# Patient Record
Sex: MALE | Race: WHITE | Employment: OTHER | URBAN - METROPOLITAN AREA
[De-identification: names, ages, dates, MRNs, and addresses within clinical notes are randomized per-mention and may not be internally consistent; named-entity substitution may affect disease eponyms.]

---

## 2017-06-22 ENCOUNTER — INITIAL CONSULT (OUTPATIENT)
Dept: SURGERY | Age: 82
End: 2017-06-22

## 2017-06-22 VITALS
WEIGHT: 157.6 LBS | DIASTOLIC BLOOD PRESSURE: 74 MMHG | HEART RATE: 82 BPM | BODY MASS INDEX: 24.74 KG/M2 | HEIGHT: 67 IN | SYSTOLIC BLOOD PRESSURE: 101 MMHG

## 2017-06-22 DIAGNOSIS — K81.0 ACUTE CHOLECYSTITIS: Primary | ICD-10-CM

## 2017-06-22 PROCEDURE — 99203 OFFICE O/P NEW LOW 30 MIN: CPT | Performed by: SURGERY

## 2017-06-22 PROCEDURE — 4040F PNEUMOC VAC/ADMIN/RCVD: CPT | Performed by: SURGERY

## 2017-06-22 PROCEDURE — G8427 DOCREV CUR MEDS BY ELIG CLIN: HCPCS | Performed by: SURGERY

## 2017-06-22 PROCEDURE — 1036F TOBACCO NON-USER: CPT | Performed by: SURGERY

## 2017-06-22 PROCEDURE — G8420 CALC BMI NORM PARAMETERS: HCPCS | Performed by: SURGERY

## 2017-09-01 ENCOUNTER — TELEPHONE (OUTPATIENT)
Dept: SURGERY | Age: 82
End: 2017-09-01

## 2018-02-18 PROBLEM — J44.9 COPD (CHRONIC OBSTRUCTIVE PULMONARY DISEASE) (HCC): Status: ACTIVE | Noted: 2018-02-18

## 2018-02-18 PROBLEM — K80.00 ACUTE CALCULOUS CHOLECYSTITIS: Status: ACTIVE | Noted: 2017-06-22

## 2018-03-02 ENCOUNTER — TELEPHONE (OUTPATIENT)
Dept: SURGERY | Age: 83
End: 2018-03-02

## 2018-03-02 NOTE — TELEPHONE ENCOUNTER
Patient's wife called stating he had a small amount of drainage around the tube-she changed the bandage. He is doing well and made an appointment for 3/8/18.

## 2018-03-08 ENCOUNTER — OFFICE VISIT (OUTPATIENT)
Dept: SURGERY | Age: 83
End: 2018-03-08

## 2018-03-08 VITALS
SYSTOLIC BLOOD PRESSURE: 146 MMHG | TEMPERATURE: 102 F | HEART RATE: 47 BPM | WEIGHT: 144.2 LBS | HEIGHT: 68 IN | BODY MASS INDEX: 21.86 KG/M2 | DIASTOLIC BLOOD PRESSURE: 94 MMHG

## 2018-03-08 DIAGNOSIS — K81.0 ACUTE CHOLECYSTITIS: Primary | ICD-10-CM

## 2018-03-08 PROCEDURE — 99212 OFFICE O/P EST SF 10 MIN: CPT | Performed by: SURGERY

## 2018-03-08 PROCEDURE — 1036F TOBACCO NON-USER: CPT | Performed by: SURGERY

## 2018-03-08 PROCEDURE — 1111F DSCHRG MED/CURRENT MED MERGE: CPT | Performed by: SURGERY

## 2018-03-08 PROCEDURE — G8427 DOCREV CUR MEDS BY ELIG CLIN: HCPCS | Performed by: SURGERY

## 2018-03-08 PROCEDURE — 1123F ACP DISCUSS/DSCN MKR DOCD: CPT | Performed by: SURGERY

## 2018-03-08 PROCEDURE — G8484 FLU IMMUNIZE NO ADMIN: HCPCS | Performed by: SURGERY

## 2018-03-08 PROCEDURE — G8420 CALC BMI NORM PARAMETERS: HCPCS | Performed by: SURGERY

## 2018-03-08 PROCEDURE — 4040F PNEUMOC VAC/ADMIN/RCVD: CPT | Performed by: SURGERY

## 2018-03-17 PROBLEM — T85.518A CHOLECYSTOSTOMY TUBE DYSFUNCTION: Status: ACTIVE | Noted: 2018-03-17

## 2018-03-21 ENCOUNTER — TELEPHONE (OUTPATIENT)
Dept: SURGERY | Age: 83
End: 2018-03-21

## 2018-03-28 ENCOUNTER — TELEPHONE (OUTPATIENT)
Dept: SURGERY | Age: 83
End: 2018-03-28

## 2018-04-09 ENCOUNTER — TELEPHONE (OUTPATIENT)
Dept: SURGERY | Age: 83
End: 2018-04-09

## 2018-04-16 ENCOUNTER — OFFICE VISIT (OUTPATIENT)
Dept: SURGERY | Age: 83
End: 2018-04-16

## 2018-04-16 VITALS
SYSTOLIC BLOOD PRESSURE: 101 MMHG | HEIGHT: 68 IN | BODY MASS INDEX: 21.89 KG/M2 | HEART RATE: 93 BPM | DIASTOLIC BLOOD PRESSURE: 70 MMHG | WEIGHT: 144.4 LBS

## 2018-04-16 DIAGNOSIS — Z90.49 S/P LAPAROSCOPIC CHOLECYSTECTOMY: Primary | ICD-10-CM

## 2018-04-16 PROCEDURE — 99024 POSTOP FOLLOW-UP VISIT: CPT | Performed by: SURGERY

## 2018-04-20 PROBLEM — Z90.49 S/P LAPAROSCOPIC CHOLECYSTECTOMY: Status: ACTIVE | Noted: 2018-04-20

## 2018-06-22 PROBLEM — K92.2 GI BLEED: Status: ACTIVE | Noted: 2018-06-22

## 2018-06-30 PROBLEM — E44.0 MODERATE MALNUTRITION (HCC): Status: ACTIVE | Noted: 2018-06-30

## 2018-06-30 PROBLEM — R65.10 SIRS (SYSTEMIC INFLAMMATORY RESPONSE SYNDROME) (HCC): Status: ACTIVE | Noted: 2018-06-30

## 2018-06-30 PROBLEM — R33.9 URINARY RETENTION: Status: ACTIVE | Noted: 2018-06-30

## 2018-06-30 PROBLEM — N30.01 ACUTE CYSTITIS WITH HEMATURIA: Status: ACTIVE | Noted: 2018-06-30

## 2018-06-30 PROBLEM — E78.5 HYPERLIPIDEMIA: Chronic | Status: ACTIVE | Noted: 2018-06-30

## 2018-07-14 ENCOUNTER — APPOINTMENT (OUTPATIENT)
Dept: GENERAL RADIOLOGY | Age: 83
DRG: 189 | End: 2018-07-14
Payer: MEDICARE

## 2018-07-14 ENCOUNTER — HOSPITAL ENCOUNTER (INPATIENT)
Age: 83
LOS: 4 days | Discharge: HOME | DRG: 189 | End: 2018-07-18
Attending: EMERGENCY MEDICINE | Admitting: INTERNAL MEDICINE
Payer: MEDICARE

## 2018-07-14 DIAGNOSIS — R06.02 SOB (SHORTNESS OF BREATH): Primary | ICD-10-CM

## 2018-07-14 DIAGNOSIS — R00.0 TACHYCARDIA: ICD-10-CM

## 2018-07-14 DIAGNOSIS — J18.9 PNEUMONIA DUE TO ORGANISM: ICD-10-CM

## 2018-07-14 DIAGNOSIS — J96.21 ACUTE ON CHRONIC RESPIRATORY FAILURE WITH HYPOXIA (HCC): ICD-10-CM

## 2018-07-14 LAB
A/G RATIO: 0.9 (ref 1.1–2.2)
ALBUMIN SERPL-MCNC: 3.3 G/DL (ref 3.4–5)
ALP BLD-CCNC: 121 U/L (ref 40–129)
ALT SERPL-CCNC: 23 U/L (ref 10–40)
ANION GAP SERPL CALCULATED.3IONS-SCNC: 14 MMOL/L (ref 3–16)
AST SERPL-CCNC: 24 U/L (ref 15–37)
BASE EXCESS VENOUS: -0.5 MMOL/L (ref -3–3)
BASOPHILS ABSOLUTE: 0 K/UL (ref 0–0.2)
BASOPHILS RELATIVE PERCENT: 0.4 %
BILIRUB SERPL-MCNC: 0.7 MG/DL (ref 0–1)
BUN BLDV-MCNC: 14 MG/DL (ref 7–20)
CALCIUM SERPL-MCNC: 9 MG/DL (ref 8.3–10.6)
CARBOXYHEMOGLOBIN: 2.1 % (ref 0–1.5)
CHLORIDE BLD-SCNC: 95 MMOL/L (ref 99–110)
CO2: 26 MMOL/L (ref 21–32)
CREAT SERPL-MCNC: 0.8 MG/DL (ref 0.8–1.3)
EKG ATRIAL RATE: 102 BPM
EKG DIAGNOSIS: NORMAL
EKG P AXIS: 52 DEGREES
EKG P-R INTERVAL: 234 MS
EKG Q-T INTERVAL: 336 MS
EKG QRS DURATION: 104 MS
EKG QTC CALCULATION (BAZETT): 437 MS
EKG R AXIS: 256 DEGREES
EKG T AXIS: 33 DEGREES
EKG VENTRICULAR RATE: 102 BPM
EOSINOPHILS ABSOLUTE: 0.1 K/UL (ref 0–0.6)
EOSINOPHILS RELATIVE PERCENT: 1.3 %
GFR AFRICAN AMERICAN: >60
GFR NON-AFRICAN AMERICAN: >60
GLOBULIN: 3.5 G/DL
GLUCOSE BLD-MCNC: 128 MG/DL (ref 70–99)
HCO3 VENOUS: 25.2 MMOL/L (ref 23–29)
HCT VFR BLD CALC: 41.7 % (ref 40.5–52.5)
HEMOGLOBIN: 14.3 G/DL (ref 13.5–17.5)
LACTIC ACID: 1.5 MMOL/L (ref 0.4–2)
LYMPHOCYTES ABSOLUTE: 0.8 K/UL (ref 1–5.1)
LYMPHOCYTES RELATIVE PERCENT: 7.9 %
MCH RBC QN AUTO: 30.9 PG (ref 26–34)
MCHC RBC AUTO-ENTMCNC: 34.3 G/DL (ref 31–36)
MCV RBC AUTO: 90.3 FL (ref 80–100)
METHEMOGLOBIN VENOUS: 0.4 %
MONOCYTES ABSOLUTE: 0.7 K/UL (ref 0–1.3)
MONOCYTES RELATIVE PERCENT: 6.7 %
NEUTROPHILS ABSOLUTE: 8.4 K/UL (ref 1.7–7.7)
NEUTROPHILS RELATIVE PERCENT: 83.7 %
O2 CONTENT, VEN: 17 VOL %
O2 SAT, VEN: 80 %
O2 THERAPY: ABNORMAL
PCO2, VEN: 45.4 MMHG (ref 40–50)
PDW BLD-RTO: 13.8 % (ref 12.4–15.4)
PH VENOUS: 7.36 (ref 7.35–7.45)
PLATELET # BLD: 221 K/UL (ref 135–450)
PMV BLD AUTO: 7.2 FL (ref 5–10.5)
PO2, VEN: 45.8 MMHG (ref 25–40)
POTASSIUM SERPL-SCNC: 4.1 MMOL/L (ref 3.5–5.1)
RBC # BLD: 4.62 M/UL (ref 4.2–5.9)
SODIUM BLD-SCNC: 135 MMOL/L (ref 136–145)
SPECIMEN STATUS: NORMAL
TCO2 CALC VENOUS: 27 MMOL/L
TOTAL PROTEIN: 6.8 G/DL (ref 6.4–8.2)
TROPONIN: <0.01 NG/ML
WBC # BLD: 10.1 K/UL (ref 4–11)

## 2018-07-14 PROCEDURE — 82803 BLOOD GASES ANY COMBINATION: CPT

## 2018-07-14 PROCEDURE — 83605 ASSAY OF LACTIC ACID: CPT

## 2018-07-14 PROCEDURE — 6360000002 HC RX W HCPCS: Performed by: PHYSICIAN ASSISTANT

## 2018-07-14 PROCEDURE — 73502 X-RAY EXAM HIP UNI 2-3 VIEWS: CPT

## 2018-07-14 PROCEDURE — 96368 THER/DIAG CONCURRENT INF: CPT

## 2018-07-14 PROCEDURE — 6370000000 HC RX 637 (ALT 250 FOR IP): Performed by: INTERNAL MEDICINE

## 2018-07-14 PROCEDURE — 96365 THER/PROPH/DIAG IV INF INIT: CPT

## 2018-07-14 PROCEDURE — 93010 ELECTROCARDIOGRAM REPORT: CPT | Performed by: INTERNAL MEDICINE

## 2018-07-14 PROCEDURE — 85025 COMPLETE CBC W/AUTO DIFF WBC: CPT

## 2018-07-14 PROCEDURE — 2700000000 HC OXYGEN THERAPY PER DAY

## 2018-07-14 PROCEDURE — 84484 ASSAY OF TROPONIN QUANT: CPT

## 2018-07-14 PROCEDURE — 2580000003 HC RX 258: Performed by: PHYSICIAN ASSISTANT

## 2018-07-14 PROCEDURE — 94664 DEMO&/EVAL PT USE INHALER: CPT

## 2018-07-14 PROCEDURE — 71046 X-RAY EXAM CHEST 2 VIEWS: CPT

## 2018-07-14 PROCEDURE — 6360000002 HC RX W HCPCS: Performed by: INTERNAL MEDICINE

## 2018-07-14 PROCEDURE — 94644 CONT INHLJ TX 1ST HOUR: CPT

## 2018-07-14 PROCEDURE — 99285 EMERGENCY DEPT VISIT HI MDM: CPT

## 2018-07-14 PROCEDURE — 6370000000 HC RX 637 (ALT 250 FOR IP): Performed by: NURSE PRACTITIONER

## 2018-07-14 PROCEDURE — 87040 BLOOD CULTURE FOR BACTERIA: CPT

## 2018-07-14 PROCEDURE — 96366 THER/PROPH/DIAG IV INF ADDON: CPT

## 2018-07-14 PROCEDURE — 2580000003 HC RX 258: Performed by: INTERNAL MEDICINE

## 2018-07-14 PROCEDURE — 1200000000 HC SEMI PRIVATE

## 2018-07-14 PROCEDURE — 80053 COMPREHEN METABOLIC PANEL: CPT

## 2018-07-14 PROCEDURE — 93005 ELECTROCARDIOGRAM TRACING: CPT | Performed by: EMERGENCY MEDICINE

## 2018-07-14 PROCEDURE — 94761 N-INVAS EAR/PLS OXIMETRY MLT: CPT

## 2018-07-14 RX ORDER — CHOLECALCIFEROL (VITAMIN D3) 1250 MCG
1 CAPSULE ORAL
COMMUNITY

## 2018-07-14 RX ORDER — SODIUM CHLORIDE 450 MG/100ML
INJECTION, SOLUTION INTRAVENOUS
Status: DISPENSED
Start: 2018-07-14 | End: 2018-07-15

## 2018-07-14 RX ORDER — 0.9 % SODIUM CHLORIDE 0.9 %
1000 INTRAVENOUS SOLUTION INTRAVENOUS ONCE
Status: COMPLETED | OUTPATIENT
Start: 2018-07-14 | End: 2018-07-14

## 2018-07-14 RX ORDER — PANTOPRAZOLE SODIUM 40 MG/1
40 TABLET, DELAYED RELEASE ORAL
Status: DISCONTINUED | OUTPATIENT
Start: 2018-07-14 | End: 2018-07-18 | Stop reason: HOSPADM

## 2018-07-14 RX ORDER — BENZONATATE 100 MG/1
100 CAPSULE ORAL 3 TIMES DAILY PRN
Status: DISCONTINUED | OUTPATIENT
Start: 2018-07-14 | End: 2018-07-18 | Stop reason: HOSPADM

## 2018-07-14 RX ORDER — ATORVASTATIN CALCIUM 10 MG/1
10 TABLET, FILM COATED ORAL DAILY
Status: DISCONTINUED | OUTPATIENT
Start: 2018-07-14 | End: 2018-07-18 | Stop reason: HOSPADM

## 2018-07-14 RX ORDER — LEVOFLOXACIN 5 MG/ML
750 INJECTION, SOLUTION INTRAVENOUS EVERY 24 HOURS
Status: DISCONTINUED | OUTPATIENT
Start: 2018-07-14 | End: 2018-07-14

## 2018-07-14 RX ORDER — POLYETHYLENE GLYCOL 3350 17 G/17G
17 POWDER, FOR SOLUTION ORAL DAILY
Status: DISCONTINUED | OUTPATIENT
Start: 2018-07-15 | End: 2018-07-18 | Stop reason: HOSPADM

## 2018-07-14 RX ORDER — DOCUSATE SODIUM 100 MG/1
100 CAPSULE, LIQUID FILLED ORAL 2 TIMES DAILY
Status: DISCONTINUED | OUTPATIENT
Start: 2018-07-14 | End: 2018-07-18 | Stop reason: HOSPADM

## 2018-07-14 RX ORDER — SODIUM CHLORIDE 0.9 % (FLUSH) 0.9 %
10 SYRINGE (ML) INJECTION PRN
Status: DISCONTINUED | OUTPATIENT
Start: 2018-07-14 | End: 2018-07-18 | Stop reason: HOSPADM

## 2018-07-14 RX ORDER — ONDANSETRON 2 MG/ML
4 INJECTION INTRAMUSCULAR; INTRAVENOUS EVERY 6 HOURS PRN
Status: DISCONTINUED | OUTPATIENT
Start: 2018-07-14 | End: 2018-07-18 | Stop reason: HOSPADM

## 2018-07-14 RX ORDER — TAMSULOSIN HYDROCHLORIDE 0.4 MG/1
0.4 CAPSULE ORAL DAILY
Status: DISCONTINUED | OUTPATIENT
Start: 2018-07-14 | End: 2018-07-18 | Stop reason: HOSPADM

## 2018-07-14 RX ORDER — OXYCODONE HYDROCHLORIDE AND ACETAMINOPHEN 5; 325 MG/1; MG/1
1 TABLET ORAL EVERY 8 HOURS PRN
COMMUNITY

## 2018-07-14 RX ORDER — CIPROFLOXACIN 2 MG/ML
400 INJECTION, SOLUTION INTRAVENOUS ONCE
Status: DISCONTINUED | OUTPATIENT
Start: 2018-07-14 | End: 2018-07-14 | Stop reason: ALTCHOICE

## 2018-07-14 RX ORDER — ASPIRIN 325 MG
325 TABLET ORAL DAILY
Status: DISCONTINUED | OUTPATIENT
Start: 2018-07-14 | End: 2018-07-18 | Stop reason: HOSPADM

## 2018-07-14 RX ORDER — HYDROCODONE BITARTRATE AND ACETAMINOPHEN 5; 325 MG/1; MG/1
1 TABLET ORAL EVERY 4 HOURS PRN
Status: DISCONTINUED | OUTPATIENT
Start: 2018-07-14 | End: 2018-07-18 | Stop reason: HOSPADM

## 2018-07-14 RX ORDER — FLUTICASONE FUROATE AND VILANTEROL 100; 25 UG/1; UG/1
1 POWDER RESPIRATORY (INHALATION) DAILY
COMMUNITY

## 2018-07-14 RX ORDER — SODIUM CHLORIDE 9 MG/ML
INJECTION, SOLUTION INTRAVENOUS
Status: DISPENSED
Start: 2018-07-14 | End: 2018-07-15

## 2018-07-14 RX ORDER — ATORVASTATIN CALCIUM 10 MG/1
10 TABLET, FILM COATED ORAL DAILY
COMMUNITY

## 2018-07-14 RX ORDER — SODIUM CHLORIDE 0.9 % (FLUSH) 0.9 %
10 SYRINGE (ML) INJECTION EVERY 12 HOURS SCHEDULED
Status: DISCONTINUED | OUTPATIENT
Start: 2018-07-14 | End: 2018-07-18 | Stop reason: HOSPADM

## 2018-07-14 RX ORDER — IPRATROPIUM BROMIDE AND ALBUTEROL SULFATE 2.5; .5 MG/3ML; MG/3ML
1 SOLUTION RESPIRATORY (INHALATION)
Status: DISCONTINUED | OUTPATIENT
Start: 2018-07-14 | End: 2018-07-18 | Stop reason: HOSPADM

## 2018-07-14 RX ADMIN — HYDROCODONE BITARTRATE AND ACETAMINOPHEN 1 TABLET: 5; 325 TABLET ORAL at 21:45

## 2018-07-14 RX ADMIN — PIPERACILLIN SODIUM, TAZOBACTAM SODIUM 3.38 G: 3; .375 INJECTION, POWDER, LYOPHILIZED, FOR SOLUTION INTRAVENOUS at 16:27

## 2018-07-14 RX ADMIN — IPRATROPIUM BROMIDE AND ALBUTEROL SULFATE 1 AMPULE: .5; 3 SOLUTION RESPIRATORY (INHALATION) at 20:30

## 2018-07-14 RX ADMIN — CEFEPIME 2 G: 2 INJECTION, POWDER, FOR SOLUTION INTRAMUSCULAR; INTRAVENOUS at 18:41

## 2018-07-14 RX ADMIN — ATORVASTATIN CALCIUM 10 MG: 10 TABLET, FILM COATED ORAL at 21:45

## 2018-07-14 RX ADMIN — VANCOMYCIN HYDROCHLORIDE 1000 MG: 1 INJECTION, POWDER, LYOPHILIZED, FOR SOLUTION INTRAVENOUS at 16:32

## 2018-07-14 RX ADMIN — SODIUM CHLORIDE 1000 ML: 9 INJECTION, SOLUTION INTRAVENOUS at 16:27

## 2018-07-14 RX ADMIN — Medication 10 ML: at 21:45

## 2018-07-14 RX ADMIN — ENOXAPARIN SODIUM 40 MG: 40 INJECTION, SOLUTION INTRAVENOUS; SUBCUTANEOUS at 18:41

## 2018-07-14 RX ADMIN — DOCUSATE SODIUM 100 MG: 100 CAPSULE, LIQUID FILLED ORAL at 21:45

## 2018-07-14 ASSESSMENT — PAIN SCALES - GENERAL
PAINLEVEL_OUTOF10: 10
PAINLEVEL_OUTOF10: 5

## 2018-07-14 ASSESSMENT — PAIN DESCRIPTION - PAIN TYPE: TYPE: ACUTE PAIN

## 2018-07-14 ASSESSMENT — PAIN DESCRIPTION - DESCRIPTORS: DESCRIPTORS: BURNING

## 2018-07-14 ASSESSMENT — PAIN DESCRIPTION - LOCATION: LOCATION: THROAT

## 2018-07-14 NOTE — PROGRESS NOTES
Pt arrived to C5 in stable condition. VS WNL. Call light given, bed alarm on. Pt oriented to room. Pt c/o right hip pain, MD notified, xray ordered.

## 2018-07-14 NOTE — H&P
Hospital Medicine History & Physical      PCP: Huseyin Graves    Date of Admission: 7/14/2018    Date of Service: Pt seen/examined on 7/14/2018 and Admitted to Inpatient with expected LOS greater than two midnights due to medical therapy. .    Chief Complaint:  Cough and shortness of breath      History Of Present Illness:      80 y.o. male who presented to [facility] with about complaint. he was recently discharged from the hospital after being treated for pneumonia and respiratory failure. Currently he is getting rehabilitation at the skilled nursing facility. He developed shortness of breath and cough for last couple of days and his oxygen need has gone up. This reason he was sent over here. Currently he is comfortable on file liter oxygen. He does have cough with some sputum. There is no fever. No change in mental status, chest pain, nausea vomiting or diarrhea. His appetite is not that great. He cannot participate in rehabilitation because of increasing shortness of breath. 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    CAD (coronary artery disease)     Cancer (HCC)     lung     Cancer ([facility] [facility]     skin    COPD (chronic obstructive pulmonary disease) (HCC)     Heart murmur     Hypercholesteremia     Hypertension     Influenza A 12/7/14       Past Surgical History:          Procedure Laterality Date    ABDOMINAL AORTIC ANEURYSM REPAIR      CHOLECYSTECTOMY, LAPAROSCOPIC N/A 04/03/2018    UPPER GASTROINTESTINAL ENDOSCOPY  06/23/2018    small tear seen in esophagus       Medications Prior to Admission:      Prior to Admission medications    Medication Sig Start Date End Date Taking?  Authorizing Provider   Cholecalciferol (VITAMIN D3) 27204 units CAPS Take 1 capsule by mouth every 30 days   Yes Historical Provider, MD   nystatin (MYCOSTATIN) 832601 UNIT/ML suspension Take 10 mLs by mouth 3 times daily   Yes Historical Provider, MD oxyCODONE-acetaminophen (PERCOCET) 5-325 MG per tablet Take 1 tablet by mouth every 8 hours as needed for Pain. .   Yes Historical Provider, MD   atorvastatin (LIPITOR) 10 MG tablet Take 10 mg by mouth daily   Yes Historical Provider, MD   fluticasone-vilanterol (BREO ELLIPTA) 100-25 MCG/INH AEPB inhaler Inhale 1 puff into the lungs daily   Yes Historical Provider, MD   docusate sodium (COLACE, DULCOLAX) 100 MG CAPS Take 100 mg by mouth 2 times daily 7/5/18  Yes GEORGIA Fisher CNP   magnesium hydroxide (MILK OF MAGNESIA) 400 MG/5ML suspension Take 30 mLs by mouth daily as needed for Constipation 7/5/18  Yes GEORGIA Fisher CNP   tamsulosin [facility]) 0.4 MG capsule Take 1 capsule by mouth daily 6/28/18  Yes Mckay Prather MD   pantoprazole (PROTONIX) 40 MG tablet Take 1 tablet by mouth 2 times daily (before meals) 6/27/18  Yes Mckay Prather MD   cyclobenzaprine (FLEXERIL) 5 MG tablet Take 1 tablet by mouth 3 times daily as needed for Muscle spasms 6/27/18  Yes Mckay Prather MD   lidocaine (LIDODERM) 5 % Place 1 patch onto the skin daily 12 hours on, 12 hours off. 6/21/18  Yes GEORGIA Butler CNP   tiotropium (SPIRIVA) 18 MCG inhalation capsule Inhale 18 mcg into the lungs daily   Yes Historical Provider, MD   benzonatate (TESSALON PERLES) 100 MG capsule Take 100 mg by mouth 3 times daily as needed for Cough   Yes Historical Provider, MD   therapeutic multivitamin-minerals (THERAGRAN-M) tablet Take 1 tablet by mouth daily. Yes Historical Provider, MD   albuterol (PROVENTIL HFA;VENTOLIN HFA) 108 (90 BASE) MCG/ACT inhaler Inhale 2 puffs into the lungs every 6 hours as needed. Yes Historical Provider, MD   aspirin 325 MG tablet Take 325 mg by mouth daily. Yes Historical Provider, MD       Allergies:  Patient has no known allergies.     Social History:      The patient currently lives at skilled nursing facility at this time    TOBACCO:   reports that he quit smoking about 37 years ago. He has never used smokeless tobacco.  ETOH:   reports that he does not drink alcohol. Family History:      Reviewed in detail and negative for DM, CAD, Cancer, CVA. Positive as follows:    Family History   Problem Relation Age of Onset    No Known Problems Mother     No Known Problems Father        REVIEW OF SYSTEMS:   Pertinent positives as noted in the HPI. All other systems reviewed and negative. PHYSICAL EXAM PERFORMED:    /83   Pulse 103   Temp 98.1 °F (36.7 °C) (Oral)   Resp 17   Ht 5' 8\" (1.727 m)   Wt 155 lb (70.3 kg)   SpO2 96%   BMI 23.57 kg/m²     General appearance:  No apparent distress, appears stated age and cooperative. on oxygen 5 L, very frail  HEENT:  Normal cephalic, atraumatic without obvious deformity. Pupils equal, round, and reactive to light. Extra ocular muscles intact. Conjunctivae/corneas clear. Neck: Supple, with full range of motion. No jugular venous distention. Trachea midline. Respiratory:  Normal respiratory effort. reduced air entry, bilaterally with  Rales/ no Wheezes/Rhonchi. Cardiovascular:  Regular rate and rhythm with normal S1/S2 with murmurs, rubs or gallops. Abdomen: Soft, non-tender, non-distended with normal bowel sounds. Musculoskeletal:  No clubbing, cyanosis or edema bilaterally. Full range of motion without deformity. Skin: Skin color, texture, turgor normal.  No rashes or lesions. Neurologic:  Neurovascularly intact without any focal sensory/motor deficits.  Cranial nerves: II-XII intact, grossly non-focal.  Psychiatric:  Alert and oriented, forgetful  Capillary Refill: Brisk,< 3 seconds   Peripheral Pulses: +2 palpable, equal bilaterally       Labs:     Recent Labs      07/14/18   1510   WBC  10.1   HGB  14.3   HCT  41.7   PLT  221     Recent Labs      07/14/18   1510   NA  135*   K  4.1   CL  95*   CO2  26   BUN  14   CREATININE  0.8   CALCIUM  9.0     Recent Labs      07/14/18   1510   AST  24   ALT  23   BILITOT 0. 7   ALKPHOS  121     No results for input(s): INR in the last 72 hours. Recent Labs      07/14/18   1510   TROPONINI  <0.01       Urinalysis:      Lab Results   Component Value Date    NITRU POSITIVE 06/29/2018    WBCUA >100 06/29/2018    BACTERIA Rare 06/27/2018    RBCUA >100 06/29/2018    BLOODU LARGE 06/29/2018    SPECGRAV 1.015 06/29/2018    GLUCOSEU Negative 06/29/2018       Radiology:     CXR: I have reviewed the CXR with the following interpretation:   EKG:  I have reviewed the EKG with the following interpretation: Sinus tachycardia with 1st degree A-V block with occasional Premature ventricular complexesPossible Left atrial enlargementRight superior axis deviationIncomplete right bundle branch blockpossible RVHAbnormal ECGWhen compared with ECG of 22-JUN-2018 20:54,PVC's less prominentConfirmed by Brandy Norris MD, YOSVANY     XR CHEST STANDARD (2 VW)   Final Result   1. Consolidative fibrotic changes are seen in the lower lungs ; superimposed   pneumonitis can't be excluded. 2. Calcific atherosclerotic disease aorta. 3. Pulmonary sequela typical of that seen with smoking, including emphysema. Correlate with clinical history.              ASSESSMENT:    Active Hospital Problems    Diagnosis Date Noted    PNA (pneumonia) [J18.9] 07/14/2018    Aortic stenosis, moderate [I35.0]     [facility] [J44.1] 04/17/2015    HTN (hypertension) [I10] 04/17/2015    HLD (hyperlipidemia) [E78.5] 04/17/2015         PLAN:  Acute on chronic hypoxic respiratory failure with cough and shortness of breath-possibility of healthcare associated pneumonia, still worsening COPD and interstitial lung disease other possibilities-admit, antibiotics, breathing treatment, consider steroid, swallowing eval and pulmonology evaluation if necessary    Moderate aortic stenosis    Hypertension-blood pressure is controlled-not on blood pressure medication    chronic diastolic CHF-no evidence of fluid overload      DVT Prophylaxis: Lovenox  Diet:  cardiac  Code Status: full code    PT/OT Eval Status: ordered    Dispo - 2-3 days  Addendum  Patient complaining right hip pain in the floor and claimed that he had a fall a couple of days ago, x-ray of the right hip ordered and no evidence of fracture, showed stool burden, started on June MD Christal    Thank you Arin Garcia for the opportunity to be involved in this patient's care. If you have any questions or concerns please feel free to contact me at [phone].

## 2018-07-14 NOTE — CONSULTS
Pharmacy to Dose Vancomycin    Dx: PNA  Goal trough = 15-20 mcg/mL  Pt wt = 70.3kg  Estimated Creatinine Clearance: 68 mL/min (based on SCr of 0.8 mg/dL). Start Vancomycin 1000mg IVPB q12h   Vancomycin trough prior to 4th dose (i.e.;  0500 7/16 dose)  [facility] 5:31 PM 7/14/2018 7/16/18  Vancomycin trough - 14.7mcg/mL @ 0412 prior to 4th dose.    Continue 1000mg Q12H  Patience William PharmD 7/16/2018 9:18 AM

## 2018-07-15 ENCOUNTER — APPOINTMENT (OUTPATIENT)
Dept: ULTRASOUND IMAGING | Age: 83
DRG: 189 | End: 2018-07-15
Payer: MEDICARE

## 2018-07-15 LAB
ANION GAP SERPL CALCULATED.3IONS-SCNC: 8 MMOL/L (ref 3–16)
BASOPHILS ABSOLUTE: 0 K/UL (ref 0–0.2)
BASOPHILS RELATIVE PERCENT: 0.6 %
BUN BLDV-MCNC: 11 MG/DL (ref 7–20)
CALCIUM SERPL-MCNC: 8.8 MG/DL (ref 8.3–10.6)
CHLORIDE BLD-SCNC: 101 MMOL/L (ref 99–110)
CO2: 30 MMOL/L (ref 21–32)
CREAT SERPL-MCNC: 0.7 MG/DL (ref 0.8–1.3)
EOSINOPHILS ABSOLUTE: 0.3 K/UL (ref 0–0.6)
EOSINOPHILS RELATIVE PERCENT: 4.2 %
GFR AFRICAN AMERICAN: >60
GFR NON-AFRICAN AMERICAN: >60
GLUCOSE BLD-MCNC: 96 MG/DL (ref 70–99)
HCT VFR BLD CALC: 38.5 % (ref 40.5–52.5)
HEMOGLOBIN: 13.3 G/DL (ref 13.5–17.5)
LYMPHOCYTES ABSOLUTE: 0.9 K/UL (ref 1–5.1)
LYMPHOCYTES RELATIVE PERCENT: 13.8 %
MCH RBC QN AUTO: 31.1 PG (ref 26–34)
MCHC RBC AUTO-ENTMCNC: 34.6 G/DL (ref 31–36)
MCV RBC AUTO: 89.9 FL (ref 80–100)
MONOCYTES ABSOLUTE: 0.6 K/UL (ref 0–1.3)
MONOCYTES RELATIVE PERCENT: 9.4 %
NEUTROPHILS ABSOLUTE: 4.9 K/UL (ref 1.7–7.7)
NEUTROPHILS RELATIVE PERCENT: 72 %
PDW BLD-RTO: 13.7 % (ref 12.4–15.4)
PLATELET # BLD: 196 K/UL (ref 135–450)
PMV BLD AUTO: 7.6 FL (ref 5–10.5)
POTASSIUM REFLEX MAGNESIUM: 3.9 MMOL/L (ref 3.5–5.1)
RBC # BLD: 4.28 M/UL (ref 4.2–5.9)
SODIUM BLD-SCNC: 139 MMOL/L (ref 136–145)
WBC # BLD: 6.8 K/UL (ref 4–11)

## 2018-07-15 PROCEDURE — 36415 COLL VENOUS BLD VENIPUNCTURE: CPT

## 2018-07-15 PROCEDURE — 2700000000 HC OXYGEN THERAPY PER DAY

## 2018-07-15 PROCEDURE — 87449 NOS EACH ORGANISM AG IA: CPT

## 2018-07-15 PROCEDURE — 80048 BASIC METABOLIC PNL TOTAL CA: CPT

## 2018-07-15 PROCEDURE — 85025 COMPLETE CBC W/AUTO DIFF WBC: CPT

## 2018-07-15 PROCEDURE — 6370000000 HC RX 637 (ALT 250 FOR IP): Performed by: NURSE PRACTITIONER

## 2018-07-15 PROCEDURE — 87641 MR-STAPH DNA AMP PROBE: CPT

## 2018-07-15 PROCEDURE — 94645 CONT INHLJ TX EACH ADDL HOUR: CPT

## 2018-07-15 PROCEDURE — 94761 N-INVAS EAR/PLS OXIMETRY MLT: CPT

## 2018-07-15 PROCEDURE — 6370000000 HC RX 637 (ALT 250 FOR IP): Performed by: INTERNAL MEDICINE

## 2018-07-15 PROCEDURE — 6360000002 HC RX W HCPCS: Performed by: INTERNAL MEDICINE

## 2018-07-15 PROCEDURE — 1200000000 HC SEMI PRIVATE

## 2018-07-15 PROCEDURE — 76536 US EXAM OF HEAD AND NECK: CPT

## 2018-07-15 PROCEDURE — 84145 PROCALCITONIN (PCT): CPT

## 2018-07-15 PROCEDURE — 2580000003 HC RX 258: Performed by: INTERNAL MEDICINE

## 2018-07-15 RX ADMIN — IPRATROPIUM BROMIDE AND ALBUTEROL SULFATE 1 AMPULE: .5; 3 SOLUTION RESPIRATORY (INHALATION) at 16:35

## 2018-07-15 RX ADMIN — HYDROCODONE BITARTRATE AND ACETAMINOPHEN 1 TABLET: 5; 325 TABLET ORAL at 04:31

## 2018-07-15 RX ADMIN — ATORVASTATIN CALCIUM 10 MG: 10 TABLET, FILM COATED ORAL at 10:28

## 2018-07-15 RX ADMIN — PANTOPRAZOLE SODIUM 40 MG: 40 TABLET, DELAYED RELEASE ORAL at 06:33

## 2018-07-15 RX ADMIN — VANCOMYCIN HYDROCHLORIDE 1000 MG: 1 INJECTION, POWDER, LYOPHILIZED, FOR SOLUTION INTRAVENOUS at 16:42

## 2018-07-15 RX ADMIN — IPRATROPIUM BROMIDE AND ALBUTEROL SULFATE 1 AMPULE: .5; 3 SOLUTION RESPIRATORY (INHALATION) at 19:44

## 2018-07-15 RX ADMIN — HYDROCODONE BITARTRATE AND ACETAMINOPHEN 1 TABLET: 5; 325 TABLET ORAL at 21:46

## 2018-07-15 RX ADMIN — ENOXAPARIN SODIUM 40 MG: 40 INJECTION, SOLUTION INTRAVENOUS; SUBCUTANEOUS at 10:27

## 2018-07-15 RX ADMIN — VANCOMYCIN HYDROCHLORIDE 1000 MG: 1 INJECTION, POWDER, LYOPHILIZED, FOR SOLUTION INTRAVENOUS at 04:31

## 2018-07-15 RX ADMIN — Medication 10 ML: at 20:58

## 2018-07-15 RX ADMIN — PANTOPRAZOLE SODIUM 40 MG: 40 TABLET, DELAYED RELEASE ORAL at 16:43

## 2018-07-15 RX ADMIN — TAMSULOSIN HYDROCHLORIDE 0.4 MG: 0.4 CAPSULE ORAL at 10:33

## 2018-07-15 RX ADMIN — DOCUSATE SODIUM 100 MG: 100 CAPSULE, LIQUID FILLED ORAL at 20:58

## 2018-07-15 RX ADMIN — DOCUSATE SODIUM 100 MG: 100 CAPSULE, LIQUID FILLED ORAL at 10:28

## 2018-07-15 RX ADMIN — HYDROCODONE BITARTRATE AND ACETAMINOPHEN 1 TABLET: 5; 325 TABLET ORAL at 16:01

## 2018-07-15 RX ADMIN — ASPIRIN 325 MG: 325 TABLET ORAL at 10:28

## 2018-07-15 RX ADMIN — Medication 10 ML: at 10:29

## 2018-07-15 RX ADMIN — IPRATROPIUM BROMIDE AND ALBUTEROL SULFATE 1 AMPULE: .5; 3 SOLUTION RESPIRATORY (INHALATION) at 08:58

## 2018-07-15 RX ADMIN — CEFEPIME 2 G: 2 INJECTION, POWDER, FOR SOLUTION INTRAMUSCULAR; INTRAVENOUS at 02:34

## 2018-07-15 RX ADMIN — IPRATROPIUM BROMIDE AND ALBUTEROL SULFATE 1 AMPULE: .5; 3 SOLUTION RESPIRATORY (INHALATION) at 12:14

## 2018-07-15 RX ADMIN — CEFEPIME 2 G: 2 INJECTION, POWDER, FOR SOLUTION INTRAMUSCULAR; INTRAVENOUS at 10:33

## 2018-07-15 RX ADMIN — CEFEPIME 2 G: 2 INJECTION, POWDER, FOR SOLUTION INTRAMUSCULAR; INTRAVENOUS at 20:17

## 2018-07-15 ASSESSMENT — PAIN SCALES - GENERAL
PAINLEVEL_OUTOF10: 9
PAINLEVEL_OUTOF10: 10
PAINLEVEL_OUTOF10: 10
PAINLEVEL_OUTOF10: 9
PAINLEVEL_OUTOF10: 0

## 2018-07-15 ASSESSMENT — PAIN DESCRIPTION - ORIENTATION: ORIENTATION: LOWER

## 2018-07-15 ASSESSMENT — PAIN DESCRIPTION - PAIN TYPE: TYPE: ACUTE PAIN;CHRONIC PAIN

## 2018-07-15 ASSESSMENT — PAIN DESCRIPTION - LOCATION: LOCATION: BACK

## 2018-07-15 NOTE — ED PROVIDER NOTES
[facility] Emergency Department    CHIEF COMPLAINT  Shortness of Breath (last night was getting out of chair, c/o dizziness and fell on buttocks, denies hitting head or loc. today pt denies any dizziness. started c/o sob approx 1 hr ago; o2 sat 67% on his 3L o2 per ems. states he also feels burning in his throat. (was started on nystatin yesterday for thrush))      HISTORY OF PRESENT ILLNESS  Caryl Solano is a 80 y.o. male who presents to the ED complaining of cough and shortness of breath. Patient's observed lying in bed, appears nontoxic and in no acute distress at this time. Accompanied today by wife brought in by squad for evaluation. Patient history of COPD and pulmonary fibrosis. States that he has had some worsening shortness of breath. Cough nonproductive. No hemoptysis. Denies fevers chills. No chest pain. No headaches or confusion. Denies abdominal pain. No back pain. No calf pain or swelling. Uses no blood thinners. Patient had O2 sats in the upper 60s on 5 L nasal cannula per EMS. Patient does wear home oxygen. States that he is also having burning sensation in throat. Is currently being treated for oral thrush. No other complaints, modifying factors or associated symptoms. Nursing notes reviewed.    Past Medical History:   Diagnosis Date    Aneurysm ([address]     abdominal aortic    CAD (coronary artery disease)     Cancer (HCC)     lung     Cancer ([address]     skin    COPD (chronic obstructive pulmonary disease) (HCC)     Heart murmur     Hypercholesteremia     Hypertension     Influenza A 12/7/14     Past Surgical History:   Procedure Laterality Date    ABDOMINAL AORTIC ANEURYSM REPAIR      CHOLECYSTECTOMY, LAPAROSCOPIC N/A 04/03/2018    UPPER GASTROINTESTINAL ENDOSCOPY  06/23/2018    small tear seen in esophagus     Family History   Problem Relation Age of Onset    No Known Problems Mother     No Known Problems Father      Social History     Social History    Marital status:      Spouse name: N/A    Number of children: N/A    Years of education: N/A     Occupational History    Not on file.      Social History Main Topics    Smoking status: Former Smoker     Quit date: 12/5/1980    Smokeless tobacco: Never Used    Alcohol use No    Drug use: No    Sexual activity: Not on file     Other Topics Concern    Not on file     Social History Narrative    No narrative on file     Current Facility-Administered Medications   Medication Dose Route Frequency Provider Last Rate Last Dose    aspirin tablet 325 mg  325 mg Oral Daily Kiersten Tijerina MD   Stopped at 07/14/18 1834    atorvastatin (LIPITOR) tablet 10 mg  10 mg Oral Daily Kiersten Tijernia MD   10 mg at 07/14/18 2145    benzonatate (TESSALON) capsule 100 mg  100 mg Oral TID PRN Kiersten Tijerina MD        docusate sodium (COLACE) capsule 100 mg  100 mg Oral BID Kiersten Tijerina MD   100 mg at 07/14/18 2145    pantoprazole (PROTONIX) tablet 40 mg  40 mg Oral BID AC Kiersten Tijerina MD   40 mg at 07/15/18 0384    tamsulosin (FLOMAX) capsule 0.4 mg  0.4 mg Oral Daily Kiersten Tijerina MD        sodium chloride flush 0.9 % injection 10 mL  10 mL Intravenous 2 times per day Kiersten Tijerina MD   10 mL at 07/14/18 2145    sodium chloride flush 0.9 % injection 10 mL  10 mL Intravenous PRN Kiersten Tijerina MD        magnesium hydroxide (MILK OF MAGNESIA) 400 MG/5ML suspension 30 mL  30 mL Oral Daily PRN Kiersten Tijerina MD        ondansetron (ZOFRAN) injection 4 mg  4 mg Intravenous Q6H PRN Kiersten Tijerina MD        enoxaparin (LOVENOX) injection 40 mg  40 mg Subcutaneous Daily Kiersten Tijerina MD   40 mg at 07/14/18 1841    ipratropium-albuterol (DUONEB) nebulizer solution 1 ampule  1 ampule Inhalation Q4H WA Kiersten Tijerina MD   1 ampule at 07/15/18 0858    cefepime (MAXIPIME) 2 g IVPB minibag  2 g Intravenous Q8H Kiersten Tijerina MD   Stopped at 07/15/18 2052  vancomycin 1000 mg IVPB in 250 mL D5W addavial  15 mg/kg Intravenous Q12H Elizabeth Leiva MD   Stopped at 07/15/18 0531    HYDROcodone-acetaminophen (NORCO) 5-325 MG per tablet 1 tablet  1 tablet Oral Q4H PRN Monty Paget, APRZAINAB - CNP   1 tablet at 07/15/18 0431    polyethylene glycol (GLYCOLAX) packet 17 g  17 g Oral Daily Elizabeth Leiva MD         No Known Allergies    REVIEW OF SYSTEMS  10 systems reviewed, pertinent positives per HPI otherwise noted to be negative    PHYSICAL EXAM  /67   Pulse 80   Temp 97.6 °F (36.4 °C) (Oral)   Resp 16   Ht 5' 8\" (1.727 m)   Wt 155 lb (70.3 kg)   SpO2 94%   BMI 23.57 kg/m²   GENERAL APPEARANCE: Awake and alert. Cooperative. No acute distress. HEAD: Normocephalic. Atraumatic. EYES: PERRL. EOM's grossly intact. ENT: Mucous membranes are moist.  Mild white plaquing noted to tongue. No redness or swelling. Oropharynx patent. NECK: Supple. HEART: RRR. No murmurs. LUNGS: Respirations unlabored. CTAB. Good air exchange. Speaking comfortably in full sentences. ABDOMEN: Soft. Non-distended. Non-tender. No guarding or rebound. No masses. No organomegaly. EXTREMITIES: No peripheral edema. Moves all extremities equally. All extremities neurovascularly intact. SKIN: Warm and dry. No acute rashes. NEUROLOGICAL: Alert and oriented. CN's 2-12 intact. No gross facial drooping. Strength 5/5, sensation intact. PSYCHIATRIC: Normal mood and affect. RADIOLOGY  Xr Chest Standard (2 Vw)    Result Date: 7/14/2018  EXAMINATION: TWO VIEWS OF THE CHEST 7/14/2018 3:26 pm COMPARISON: Chest 07/02/2018 HISTORY: ORDERING SYSTEM PROVIDED HISTORY: sob, hypoxic TECHNOLOGIST PROVIDED HISTORY: Reason for exam:->sob, hypoxic Ordering Physician Provided Reason for Exam: sob, hypoxic FINDINGS: Cardiac silhouette appears within normal limits for size. Calcific atherosclerotic disease aorta. Mediastinum appears otherwise unremarkable.  The hilar silhouettes appear auto differential   Result Value Ref Range    WBC 6.8 4.0 - 11.0 K/uL    RBC 4.28 4.20 - 5.90 M/uL    Hemoglobin 13.3 (L) 13.5 - 17.5 g/dL    Hematocrit 38.5 (L) 40.5 - 52.5 %    MCV 89.9 80.0 - 100.0 fL    MCH 31.1 26.0 - 34.0 pg    MCHC 34.6 31.0 - 36.0 g/dL    RDW 13.7 12.4 - 15.4 %    Platelets 746 556 - 588 K/uL    MPV 7.6 5.0 - 10.5 fL    Neutrophils % 72.0 %    Lymphocytes % 13.8 %    Monocytes % 9.4 %    Eosinophils % 4.2 %    Basophils % 0.6 %    Neutrophils # 4.9 1.7 - 7.7 K/uL    Lymphocytes # 0.9 (L) 1.0 - 5.1 K/uL    Monocytes # 0.6 0.0 - 1.3 K/uL    Eosinophils # 0.3 0.0 - 0.6 K/uL    Basophils # 0.0 0.0 - 0.2 K/uL   Basic Metabolic Panel w/ Reflex to MG   Result Value Ref Range    Sodium 139 136 - 145 mmol/L    Potassium reflex Magnesium 3.9 3.5 - 5.1 mmol/L    Chloride 101 99 - 110 mmol/L    CO2 30 21 - 32 mmol/L    Anion Gap 8 3 - 16    Glucose 96 70 - 99 mg/dL    BUN 11 7 - 20 mg/dL    CREATININE 0.7 (L) 0.8 - 1.3 mg/dL    GFR Non-African American >60 >60    GFR African American >60 >60    Calcium 8.8 8.3 - 10.6 mg/dL   EKG 12 Lead   Result Value Ref Range    Ventricular Rate 102 BPM    Atrial Rate 102 BPM    P-R Interval 234 ms    QRS Duration 104 ms    Q-T Interval 336 ms    QTc Calculation (Bazett) 437 ms    P Axis 52 degrees    R Axis 256 degrees    T Axis 33 degrees    Diagnosis       Sinus tachycardia with 1st degree A-V block with occasional Premature ventricular complexesPossible Left atrial enlargementRight superior axis deviationIncomplete right bundle branch blockpossible RVHAbnormal ECGWhen compared with ECG of 22-JUN-2018 20:54,PVC's less prominentConfirmed by YOSVANY Coates MD ([id number]) on 7/14/2018 5:04:54 PM     I spoke with Nory Funes and [address] We thoroughly discussed the history, physical exam, laboratory and imaging studies, as well as, emergency department course.  Based upon that discussion, we've decided to admit Krystle Figueroa to the hospital for further observation,

## 2018-07-15 NOTE — PROGRESS NOTES
RESPIRATORY THERAPY ASSESSMENT    Name:  Michelle Machuca  Medical Record Number:  [id number]  Age: 80 y.o. Gender: male  : 1934  Today's Date:  2018  Room:  [facility]6518-24    Assessment     Is the patient being admitted for a COPD or Asthma exacerbation? Yes   (If yes the patient will be seen every 4 hours for the first 24 hours and then reassessed)    Patient Admission Diagnosis      Allergies  No Known Allergies    Minimum Predicted Vital Capacity:     1020         Actual Vital Capacity:      1250          Pulmonary History:COPD  Home Oxygen Therapy:  3 liters/min via nasal cannula IN REHAB  Home Respiratory Therapy:Albuterol/Ipratropium Bromide MDI and Tiotropium Bromide BREO  Current Respiratory Therapy:  DUONEB HHN Q4 W/A  Treatment Type: HHN, IS  Medications: Albuterol/Ipratropium    Respiratory Severity Index(RSI)   Patients with orders for inhalation medications, oxygen, or any therapeutic treatment modality will be placed on Respiratory Protocol. They will be assessed with the first treatment and at least every 72 hours thereafter. The following severity scale will be used to determine frequency of treatment intervention.     Smoking History: Pulmonary Disease or Smoking History, Greater than 15 pack year = 2    Social History  Social History   Substance Use Topics    Smoking status: Former Smoker     Quit date: 1980    Smokeless tobacco: Never Used    Alcohol use No       Recent Surgical History: None = 0  Past Surgical History  Past Surgical History:   Procedure Laterality Date    ABDOMINAL AORTIC ANEURYSM REPAIR      CHOLECYSTECTOMY, LAPAROSCOPIC N/A 2018    UPPER GASTROINTESTINAL ENDOSCOPY  2018    small tear seen in esophagus       Level of Consciousness: Alert, Oriented, and Cooperative = 0    Level of Activity: Non weight bearing- transfers bed to chair only = 3    Respiratory Pattern: Regular Pattern; RR 8-20 = 0    Breath Sounds: Diminshed bilaterally and/or crackles = 2    Sputum   ,  ,    Cough: Strong, spontaneous, non-productive = 0    Vital Signs   /69   Pulse 97   Temp 98 °F (36.7 °C) (Oral)   Resp 20   Ht 5' 8\" (1.727 m)   Wt 155 lb (70.3 kg)   SpO2 98%   BMI 23.57 kg/m²   SPO2 (COPD values may differ): 86-87% on room air or greater than 92% on FiO2 35- 50% = 3    Peak Flow (asthma only): not applicable = 0    RSI: 73-18 = Q4WA (every four hours while awake) and Q4hrs PRN        Plan       Goals: medication delivery    Patient/caregiver was educated on the proper method of use for Respiratory Care Devices:  Yes      Level of patient/caregiver understanding able to:   [] Verbalize understanding   [] Demonstrate understanding       [] Teach back        [] Needs reinforcement       []  No available caregiver               []  Other:     Response to education:  Good     Is patient being placed on Home Treatment Regimen? No     Does the patient have everything they need prior to discharge? NA     Comments: CHART REVIEWED PATIENT ASSESSED. HAS NO WHEEZING BUT GETS SOB WHEN THE PAIN CURRENTLY IN HIS RIGHT HIP ACTS UP. WAS ON BREO/SPIRIVA/ALBUTEROL MDI PRN PER HOME MEDS SO JUST KEEP DUO Q4 W/A FOR NOW. Plan of Care: [facility] W/A    Electronically signed by Sri Echevarria RCP on 7/14/2018 at 8:42 PM    Respiratory Protocol Guidelines     1. Assessment and treatment by Respiratory Therapy will be initiated for medication and therapeutic interventions upon initiation of aerosolized medication. 2. Physician will be contacted for respiratory rate (RR) greater than 35 breaths per minute. Therapy will be held for heart rate (HR) greater than 140 beats per minute, pending direction from physician. 3. Bronchodilators will be administered via Metered Dose Inhaler (MDI) with spacer when the following criteria are met:  a.  Alert and cooperative     b. HR < 140 bpm  c. RR < 30 bpm                d. Can demonstrate a 23 second inspiratory hold  4. Bronchodilators will be administered via Hand Held Nebulizer [facility]) to patients when ANY of the following criteria are met  a. Incognizant or uncooperative          b. Patients treated with HHN at Home        c. Unable to demonstrate proper use of MDI with spacer     d. RR > 30 bpm   5. Bronchodilators will be delivered via Metered Dose Inhaler (MDI), HHN, Aerogen to intubated patients on mechanical ventilation. 6. Inhalation medication orders will be delivered and/or substituted as outlined below. Aerosolized Medications Ordering and Administration Guidelines:    1. All Medications will be ordered by a physician, and their frequency and/or modality will be adjusted as defined by the patients Respiratory Severity Index (RSI) score. 2. If the patient does not have documented COPD, consider discontinuing anticholinergics when RSI is less than 9.  3. If the bronchospasm worsens (increased RSI), then the bronchodilator frequency can be increased to a maximum of every 4 hours. If greater than every 4 hours is required, the physician will be contacted. 4. If the bronchospasm improves, the frequency of the bronchodilator can be decreased, based on the patient's RSI, but not less than home treatment regimen frequency. 5. Bronchodilator(s) will be discontinued if patient has a RSI less than 9 and has received no scheduled or as needed treatment for 72  Hrs. Patients Ordered on a Mucolytic Agent:    1. Must always be administered with a bronchodilator. 2. Discontinue if patient experiences worsened bronchospasm, or secretions have lessened to the point that the patient is able to clear them with a cough. Anti-inflammatory and Combination Medications:    1. If the patient lacks prior history of lung disease, is not using inhaled anti-inflammatory medication at home, and lacks wheezing by examination or by history for at least 24 hours, contact physician for possible discontinuation.

## 2018-07-16 ENCOUNTER — APPOINTMENT (OUTPATIENT)
Dept: GENERAL RADIOLOGY | Age: 83
DRG: 189 | End: 2018-07-16
Payer: MEDICARE

## 2018-07-16 LAB
ANION GAP SERPL CALCULATED.3IONS-SCNC: 8 MMOL/L (ref 3–16)
BUN BLDV-MCNC: 10 MG/DL (ref 7–20)
CALCIUM SERPL-MCNC: 8.4 MG/DL (ref 8.3–10.6)
CHLORIDE BLD-SCNC: 104 MMOL/L (ref 99–110)
CO2: 27 MMOL/L (ref 21–32)
CREAT SERPL-MCNC: 0.7 MG/DL (ref 0.8–1.3)
GFR AFRICAN AMERICAN: >60
GFR NON-AFRICAN AMERICAN: >60
GLUCOSE BLD-MCNC: 109 MG/DL (ref 70–99)
HCT VFR BLD CALC: 35.5 % (ref 40.5–52.5)
HEMOGLOBIN: 12.2 G/DL (ref 13.5–17.5)
L. PNEUMOPHILA SEROGP 1 UR AG: NORMAL
MCH RBC QN AUTO: 30.9 PG (ref 26–34)
MCHC RBC AUTO-ENTMCNC: 34.3 G/DL (ref 31–36)
MCV RBC AUTO: 89.9 FL (ref 80–100)
MRSA SCREEN RT-PCR: NORMAL
PDW BLD-RTO: 14 % (ref 12.4–15.4)
PLATELET # BLD: 168 K/UL (ref 135–450)
PMV BLD AUTO: 8 FL (ref 5–10.5)
POTASSIUM REFLEX MAGNESIUM: 4 MMOL/L (ref 3.5–5.1)
PROCALCITONIN: 0.11 NG/ML (ref 0–0.15)
RBC # BLD: 3.95 M/UL (ref 4.2–5.9)
SODIUM BLD-SCNC: 139 MMOL/L (ref 136–145)
STREP PNEUMONIAE ANTIGEN, URINE: NORMAL
TSH SERPL DL<=0.05 MIU/L-ACNC: 4.96 UIU/ML (ref 0.27–4.2)
VANCOMYCIN TROUGH: 14.7 UG/ML (ref 10–20)
WBC # BLD: 7.1 K/UL (ref 4–11)

## 2018-07-16 PROCEDURE — 94761 N-INVAS EAR/PLS OXIMETRY MLT: CPT

## 2018-07-16 PROCEDURE — 85027 COMPLETE CBC AUTOMATED: CPT

## 2018-07-16 PROCEDURE — 92611 MOTION FLUOROSCOPY/SWALLOW: CPT

## 2018-07-16 PROCEDURE — 2700000000 HC OXYGEN THERAPY PER DAY

## 2018-07-16 PROCEDURE — 6360000002 HC RX W HCPCS: Performed by: INTERNAL MEDICINE

## 2018-07-16 PROCEDURE — 74230 X-RAY XM SWLNG FUNCJ C+: CPT

## 2018-07-16 PROCEDURE — 80048 BASIC METABOLIC PNL TOTAL CA: CPT

## 2018-07-16 PROCEDURE — 84443 ASSAY THYROID STIM HORMONE: CPT

## 2018-07-16 PROCEDURE — 1200000000 HC SEMI PRIVATE

## 2018-07-16 PROCEDURE — 94664 DEMO&/EVAL PT USE INHALER: CPT

## 2018-07-16 PROCEDURE — 6370000000 HC RX 637 (ALT 250 FOR IP)

## 2018-07-16 PROCEDURE — 92610 EVALUATE SWALLOWING FUNCTION: CPT

## 2018-07-16 PROCEDURE — 94645 CONT INHLJ TX EACH ADDL HOUR: CPT

## 2018-07-16 PROCEDURE — 92526 ORAL FUNCTION THERAPY: CPT

## 2018-07-16 PROCEDURE — 6370000000 HC RX 637 (ALT 250 FOR IP): Performed by: INTERNAL MEDICINE

## 2018-07-16 PROCEDURE — G8996 SWALLOW CURRENT STATUS: HCPCS

## 2018-07-16 PROCEDURE — 36415 COLL VENOUS BLD VENIPUNCTURE: CPT

## 2018-07-16 PROCEDURE — G8997 SWALLOW GOAL STATUS: HCPCS

## 2018-07-16 PROCEDURE — 2580000003 HC RX 258: Performed by: INTERNAL MEDICINE

## 2018-07-16 PROCEDURE — 6370000000 HC RX 637 (ALT 250 FOR IP): Performed by: PEDIATRICS

## 2018-07-16 PROCEDURE — 80202 ASSAY OF VANCOMYCIN: CPT

## 2018-07-16 RX ORDER — ACETAMINOPHEN 325 MG/1
650 TABLET ORAL EVERY 4 HOURS PRN
Status: DISCONTINUED | OUTPATIENT
Start: 2018-07-16 | End: 2018-07-18 | Stop reason: HOSPADM

## 2018-07-16 RX ORDER — ACETAMINOPHEN 325 MG/1
TABLET ORAL
Status: COMPLETED
Start: 2018-07-16 | End: 2018-07-16

## 2018-07-16 RX ADMIN — POLYETHYLENE GLYCOL (3350) 17 G: 17 POWDER, FOR SOLUTION ORAL at 09:39

## 2018-07-16 RX ADMIN — IPRATROPIUM BROMIDE AND ALBUTEROL SULFATE 1 AMPULE: .5; 3 SOLUTION RESPIRATORY (INHALATION) at 12:04

## 2018-07-16 RX ADMIN — ENOXAPARIN SODIUM 40 MG: 40 INJECTION, SOLUTION INTRAVENOUS; SUBCUTANEOUS at 09:40

## 2018-07-16 RX ADMIN — DOCUSATE SODIUM 100 MG: 100 CAPSULE, LIQUID FILLED ORAL at 09:39

## 2018-07-16 RX ADMIN — ACETAMINOPHEN 650 MG: 325 TABLET, FILM COATED ORAL at 05:12

## 2018-07-16 RX ADMIN — PANTOPRAZOLE SODIUM 40 MG: 40 TABLET, DELAYED RELEASE ORAL at 05:12

## 2018-07-16 RX ADMIN — VANCOMYCIN HYDROCHLORIDE 1000 MG: 1 INJECTION, POWDER, LYOPHILIZED, FOR SOLUTION INTRAVENOUS at 05:06

## 2018-07-16 RX ADMIN — CEFEPIME 2 G: 2 INJECTION, POWDER, FOR SOLUTION INTRAMUSCULAR; INTRAVENOUS at 03:51

## 2018-07-16 RX ADMIN — NYSTATIN 500000 UNITS: 100000 SUSPENSION ORAL at 18:24

## 2018-07-16 RX ADMIN — ASPIRIN 325 MG: 325 TABLET ORAL at 09:39

## 2018-07-16 RX ADMIN — ACETAMINOPHEN 650 MG: 325 TABLET ORAL at 05:12

## 2018-07-16 RX ADMIN — PANTOPRAZOLE SODIUM 40 MG: 40 TABLET, DELAYED RELEASE ORAL at 15:43

## 2018-07-16 RX ADMIN — TAMSULOSIN HYDROCHLORIDE 0.4 MG: 0.4 CAPSULE ORAL at 09:40

## 2018-07-16 RX ADMIN — CEFEPIME 2 G: 2 INJECTION, POWDER, FOR SOLUTION INTRAMUSCULAR; INTRAVENOUS at 09:48

## 2018-07-16 RX ADMIN — ACETAMINOPHEN 650 MG: 325 TABLET, FILM COATED ORAL at 21:44

## 2018-07-16 RX ADMIN — Medication 10 ML: at 09:40

## 2018-07-16 RX ADMIN — IPRATROPIUM BROMIDE AND ALBUTEROL SULFATE 1 AMPULE: .5; 3 SOLUTION RESPIRATORY (INHALATION) at 15:32

## 2018-07-16 RX ADMIN — IPRATROPIUM BROMIDE AND ALBUTEROL SULFATE 1 AMPULE: .5; 3 SOLUTION RESPIRATORY (INHALATION) at 08:56

## 2018-07-16 RX ADMIN — IPRATROPIUM BROMIDE AND ALBUTEROL SULFATE 1 AMPULE: .5; 3 SOLUTION RESPIRATORY (INHALATION) at 20:22

## 2018-07-16 RX ADMIN — NYSTATIN 500000 UNITS: 100000 SUSPENSION ORAL at 20:12

## 2018-07-16 RX ADMIN — ATORVASTATIN CALCIUM 10 MG: 10 TABLET, FILM COATED ORAL at 09:39

## 2018-07-16 RX ADMIN — Medication 10 ML: at 20:13

## 2018-07-16 ASSESSMENT — PAIN SCALES - GENERAL: PAINLEVEL_OUTOF10: 9

## 2018-07-16 NOTE — PLAN OF CARE
Problem: Nutrition  Intervention: Swallowing evaluation  SLP completed evaluation. Please refer to EMR for details. Intervention: Aspiration precautions  SLP completed evaluation. Please refer to EMR for details.

## 2018-07-16 NOTE — PROCEDURES
INSTRUMENTAL SWALLOW REPORT  MODIFIED BARIUM SWALLOW    NAME: Romayne Nida   : 1934  MRN: [id number]       Date of Eval: 2018     Ordering Physician: Dr. Marisol Landaverde  Radiologist: Dr. Andre Cardona     Referring Diagnosis(es): Referring Diagnosis: Dysphagia    Past Medical History:  has a past medical history of Aneurysm ([facility] CAD (coronary artery disease); Cancer ([facility] Cancer [facility]); COPD (chronic obstructive pulmonary disease) ([facility] Heart murmur; Hypercholesteremia; Hypertension; and Influenza A. Past Surgical History:  has a past surgical history that includes Abdominal aortic aneurysm repair; Cholecystectomy, laparoscopic (N/A, 2018); and Upper gastrointestinal endoscopy (2018). Current Diet Solid Consistency: Regular  Current Diet Liquid Consistency: Thin    Date of Prior Study: N/A  Type of Study: Initial MBS  Results of Prior Study: N/A    Recent CXR/CT of Chest: Date 18  Impression   1. Consolidative fibrotic changes are seen in the lower lungs ; superimposed   pneumonitis can't be excluded. 2. Calcific atherosclerotic disease aorta. 3. Pulmonary sequela typical of that seen with smoking, including emphysema. Correlate with clinical history.         Patient Complaints/Reason for Referral:  Romayne Nida was referred for a MBS to assess the efficiency of his/her swallow function, assess for aspiration, and to make recommendations regarding safe dietary consistencies, effective compensatory strategies, and safe eating environment. Patient complaints: \"It feels like it's still in there\"    Onset of problem:   Date of Onset: 18    General Comment  Comments: \"I feel funny with you watching me eat\"  Subjective  Subjective: Pt alert and seated upright in fluoro chair     Behavior/Cognition/Vision/Hearing:  Behavior/Cognition: Alert; Cooperative;Pleasant mood  Vision: Impaired  Vision Exceptions: Wears glasses at all times  Hearing: Exceptions to

## 2018-07-16 NOTE — PROGRESS NOTES
Hospitalist Progress Note      PCP: Bridgette Amaya    Date of Admission: 7/14/2018    Chief Complaint: Cough and shortness of breath    Hospital Course: 80 y.o. male who presented to [facility] with about complaint. he was recently discharged from the hospital after being treated for pneumonia and respiratory failure. Currently he is getting rehabilitation at the skilled nursing facility. He developed shortness of breath and cough for last couple of days and his oxygen need has gone up. This reason he was sent over here. Currently he is comfortable on file liter oxygen. He does have cough with some sputum. There is no fever. No change in mental status, chest pain, nausea vomiting or diarrhea. His appetite is not that great. He cannot participate in rehabilitation because of increasing shortness of breath. He was admitted to [facility]. [place] : Patient evaluated by SLP - recommended MBS  . Patient feels slightly better this morning  . Seen with wife at bedside . No acute events since admission. No chest pain, nausea or vomiting.      Medications:  Reviewed    Scheduled Medications    aspirin  325 mg Oral Daily    atorvastatin  10 mg Oral Daily    docusate sodium  100 mg Oral BID    pantoprazole  40 mg Oral BID AC    tamsulosin  0.4 mg Oral Daily    sodium chloride flush  10 mL Intravenous 2 times per day    enoxaparin  40 mg Subcutaneous Daily    ipratropium-albuterol  1 ampule Inhalation Q4H WA    cefepime  2 g Intravenous Q8H    vancomycin  15 mg/kg Intravenous Q12H    polyethylene glycol  17 g Oral Daily     PRN Meds: acetaminophen, benzonatate, sodium chloride flush, magnesium hydroxide, ondansetron, HYDROcodone 5 mg - acetaminophen      Intake/Output Summary (Last 24 hours) at 07/16/18 1403  Last data filed at 07/16/18 1350   Gross per 24 hour   Intake              510 ml   Output              300 ml   Net              210 ml       Physical Exam Performed:  BP (!) 143/81

## 2018-07-16 NOTE — PROGRESS NOTES
Perfect serve message sent to Dr. Damaris Jim with the following: \"Patient usually take nystatin swish and swallow for mouth sores/thrush. Listed in home meds but no orders for here. Can we have order? Please advise. Thank you. \" Waiting for response at this time.

## 2018-07-16 NOTE — PLAN OF CARE
Palliative Care Note  Palliative Care Admit date:  7/16/18    Advance Directives:  Reviewed pts Living Will in this EMR. In the absence of a DPOA-HC, pts spouse is viewed as his NOK. Pt has a 'limited' code status to reflect his wish for DNI. Writer clearly outlined the components of CPR and the potential for lack of meaningful benefit in the face of pts co-morbidities and age. As of today, pt prefers to maintain option for cpr. Plan of care/goals:  Met @ BS w/ pt & spouse, he had just returned from [facility]. They verb'd their understanding, re: pts lung status, as \"they have told us there isn't anything else they can do. \"  Pt admits to exertional SOB and his spouse has observed that it \"takes him longer to get his breath. \"   Given the 'severe' nature of pts respiratory disease, we discussed the anticipated recurring hospitalizations which, even if felt to provide some benefit, may not sustain for very long before returning to the hospital.  Pt & spouse were receptive to discussion about the eventuality of involving hospice to help w/ sx mgt & avoid subsequent admissions. While pt felt this was worth considering, he stated \"its down the road\" and wasn't keen to consider comfort care at this juncture. Both understand that their PCP (or any HHC) could assist w/ the transition to hospice. Pt was active in, both, the [address]. He fought in the [address] [place] [address] Street. After last adm, pt was d/c to [facility] for skilled therapy. He would prefer to go home after this admission & is agreeable to home therapy if deemed appropriate. Pt c/o chronic back pain, rates his pain 10/0-10 @ worst.  He has rec'd prn Norco X2 over the last 24 hr. Last doc'd stool out was earlier today.       Reason for consult:    _X_ Advance Care Planning  ___ Transition of Care Planning  ___ Psychosocial/Spiritual Support  ___ Symptom Management                                                                                    Sai Carranza RN

## 2018-07-16 NOTE — PROGRESS NOTES
bed  Baseline Vocal Quality: Normal  Volitional Cough: Strong  Prior Dysphagia History: No known history of dysphagia. Consistencies Administered: Reg solid;Mechanical soft;Puree; Ice Chips; Thin - teaspoon; Thin - cup      Vision/Hearing  Vision: Impaired  Vision Exceptions: Wears glasses at all times  Hearing: Exceptions to [facility]  Hearing Exceptions: Hard of hearing/hearing concerns;Bilateral hearing aid    Oral Motor Deficits  Oral Motor: Within functional limits    Oral Phase Dysfunction  Oral Phase: Exceptions  Oral Phase  Oral Phase - Comment: Pt observed with regular solid trials (paty cracker), soft solid (crushed paty cracker with applesauce), and puree applesauce demonstrating with adequate mastication and A-P transit of all consistencies, good oral control of bolus, and no residue in oral cavity observed post swallow. Pt edentulous during evaluation, but had upper and lower dentures on bedside table. Pt kindly declined to wear dentures during evaluation reporting that he will occasionally eat without them, however most of the time he wears his dentures during meals. SLP recommending continue with regular solid diet at this time. Indicators of Pharyngeal Phase Dysfunction  Pharyngeal Phase: Exceptions  Indicators of Pharyngeal Phase Dysfunction  Decreased Laryngeal Elevation: All  Throat Clearing - Delayed: Reg Solid  Change in Vital Signs: Thin - cup  Pharyngeal Phase   Pharyngeal: Pt currently on 4L O2 via nasal cannula. O2 sats stable at 94% prior to PO trials. Pt observed with ice chips and thin liquids via teaspoon and cup tolerating without any overt s/s of aspiration. No coughing, wet vocal quality, or throat clearing observed with thin liquid trials. Pt's O2 sats did drop from 94-92% with thins via cup with consecutive sips, but no other s/s of aspiration observed.   Pt did demonstrate with delayed throat clearing of regular solid stating he felt like a small piece of the paty cracker

## 2018-07-16 NOTE — PLAN OF CARE
Problem: Gas Exchange - Impaired:  Goal: Levels of oxygenation will improve  Levels of oxygenation will improve   Outcome: Ongoing  Pt's oxygen levels have been holding steady since being placed on 4 liters upon arrival. Pt denies any SOB at rest. Will continue to monitor.

## 2018-07-17 LAB
ANION GAP SERPL CALCULATED.3IONS-SCNC: 10 MMOL/L (ref 3–16)
BUN BLDV-MCNC: 9 MG/DL (ref 7–20)
CALCIUM SERPL-MCNC: 9.1 MG/DL (ref 8.3–10.6)
CHLORIDE BLD-SCNC: 102 MMOL/L (ref 99–110)
CO2: 26 MMOL/L (ref 21–32)
CREAT SERPL-MCNC: 0.6 MG/DL (ref 0.8–1.3)
GFR AFRICAN AMERICAN: >60
GFR NON-AFRICAN AMERICAN: >60
GLUCOSE BLD-MCNC: 104 MG/DL (ref 70–99)
HCT VFR BLD CALC: 38.3 % (ref 40.5–52.5)
HEMOGLOBIN: 13 G/DL (ref 13.5–17.5)
MCH RBC QN AUTO: 30.8 PG (ref 26–34)
MCHC RBC AUTO-ENTMCNC: 34 G/DL (ref 31–36)
MCV RBC AUTO: 90.6 FL (ref 80–100)
PDW BLD-RTO: 14 % (ref 12.4–15.4)
PLATELET # BLD: 162 K/UL (ref 135–450)
PMV BLD AUTO: 8 FL (ref 5–10.5)
POTASSIUM REFLEX MAGNESIUM: 4.3 MMOL/L (ref 3.5–5.1)
RBC # BLD: 4.23 M/UL (ref 4.2–5.9)
SODIUM BLD-SCNC: 138 MMOL/L (ref 136–145)
WBC # BLD: 8.3 K/UL (ref 4–11)

## 2018-07-17 PROCEDURE — 6370000000 HC RX 637 (ALT 250 FOR IP): Performed by: INTERNAL MEDICINE

## 2018-07-17 PROCEDURE — 1200000000 HC SEMI PRIVATE

## 2018-07-17 PROCEDURE — G8987 SELF CARE CURRENT STATUS: HCPCS

## 2018-07-17 PROCEDURE — 94645 CONT INHLJ TX EACH ADDL HOUR: CPT

## 2018-07-17 PROCEDURE — 94664 DEMO&/EVAL PT USE INHALER: CPT

## 2018-07-17 PROCEDURE — 97110 THERAPEUTIC EXERCISES: CPT

## 2018-07-17 PROCEDURE — 85027 COMPLETE CBC AUTOMATED: CPT

## 2018-07-17 PROCEDURE — 6370000000 HC RX 637 (ALT 250 FOR IP): Performed by: PEDIATRICS

## 2018-07-17 PROCEDURE — 97165 OT EVAL LOW COMPLEX 30 MIN: CPT

## 2018-07-17 PROCEDURE — 6360000002 HC RX W HCPCS: Performed by: INTERNAL MEDICINE

## 2018-07-17 PROCEDURE — 2580000003 HC RX 258: Performed by: INTERNAL MEDICINE

## 2018-07-17 PROCEDURE — 2700000000 HC OXYGEN THERAPY PER DAY

## 2018-07-17 PROCEDURE — G8978 MOBILITY CURRENT STATUS: HCPCS

## 2018-07-17 PROCEDURE — 97530 THERAPEUTIC ACTIVITIES: CPT

## 2018-07-17 PROCEDURE — G8988 SELF CARE GOAL STATUS: HCPCS

## 2018-07-17 PROCEDURE — 36415 COLL VENOUS BLD VENIPUNCTURE: CPT

## 2018-07-17 PROCEDURE — 97162 PT EVAL MOD COMPLEX 30 MIN: CPT

## 2018-07-17 PROCEDURE — 80048 BASIC METABOLIC PNL TOTAL CA: CPT

## 2018-07-17 PROCEDURE — 94761 N-INVAS EAR/PLS OXIMETRY MLT: CPT

## 2018-07-17 PROCEDURE — 94640 AIRWAY INHALATION TREATMENT: CPT

## 2018-07-17 PROCEDURE — 97116 GAIT TRAINING THERAPY: CPT

## 2018-07-17 RX ADMIN — NYSTATIN 500000 UNITS: 100000 SUSPENSION ORAL at 07:39

## 2018-07-17 RX ADMIN — DOCUSATE SODIUM 100 MG: 100 CAPSULE, LIQUID FILLED ORAL at 07:39

## 2018-07-17 RX ADMIN — PANTOPRAZOLE SODIUM 40 MG: 40 TABLET, DELAYED RELEASE ORAL at 17:53

## 2018-07-17 RX ADMIN — IPRATROPIUM BROMIDE AND ALBUTEROL SULFATE 1 AMPULE: .5; 3 SOLUTION RESPIRATORY (INHALATION) at 09:08

## 2018-07-17 RX ADMIN — NYSTATIN 500000 UNITS: 100000 SUSPENSION ORAL at 17:53

## 2018-07-17 RX ADMIN — PANTOPRAZOLE SODIUM 40 MG: 40 TABLET, DELAYED RELEASE ORAL at 06:19

## 2018-07-17 RX ADMIN — ACETAMINOPHEN 650 MG: 325 TABLET, FILM COATED ORAL at 04:00

## 2018-07-17 RX ADMIN — TAMSULOSIN HYDROCHLORIDE 0.4 MG: 0.4 CAPSULE ORAL at 07:39

## 2018-07-17 RX ADMIN — ASPIRIN 325 MG: 325 TABLET ORAL at 07:39

## 2018-07-17 RX ADMIN — ACETAMINOPHEN 650 MG: 325 TABLET, FILM COATED ORAL at 17:56

## 2018-07-17 RX ADMIN — ACETAMINOPHEN 650 MG: 325 TABLET, FILM COATED ORAL at 07:56

## 2018-07-17 RX ADMIN — IPRATROPIUM BROMIDE AND ALBUTEROL SULFATE 1 AMPULE: .5; 3 SOLUTION RESPIRATORY (INHALATION) at 19:54

## 2018-07-17 RX ADMIN — POLYETHYLENE GLYCOL (3350) 17 G: 17 POWDER, FOR SOLUTION ORAL at 07:39

## 2018-07-17 RX ADMIN — ATORVASTATIN CALCIUM 10 MG: 10 TABLET, FILM COATED ORAL at 07:39

## 2018-07-17 RX ADMIN — Medication 10 ML: at 21:19

## 2018-07-17 RX ADMIN — Medication 10 ML: at 07:40

## 2018-07-17 RX ADMIN — ENOXAPARIN SODIUM 40 MG: 40 INJECTION, SOLUTION INTRAVENOUS; SUBCUTANEOUS at 07:39

## 2018-07-17 ASSESSMENT — PAIN DESCRIPTION - LOCATION
LOCATION: HIP

## 2018-07-17 ASSESSMENT — PAIN DESCRIPTION - PROGRESSION: CLINICAL_PROGRESSION: NOT CHANGED

## 2018-07-17 ASSESSMENT — PAIN DESCRIPTION - DESCRIPTORS: DESCRIPTORS: DISCOMFORT

## 2018-07-17 ASSESSMENT — PAIN DESCRIPTION - ONSET: ONSET: ON-GOING

## 2018-07-17 ASSESSMENT — PAIN SCALES - GENERAL
PAINLEVEL_OUTOF10: 6
PAINLEVEL_OUTOF10: 5
PAINLEVEL_OUTOF10: 3
PAINLEVEL_OUTOF10: 3

## 2018-07-17 ASSESSMENT — PAIN DESCRIPTION - PAIN TYPE
TYPE: ACUTE PAIN

## 2018-07-17 ASSESSMENT — PAIN DESCRIPTION - ORIENTATION
ORIENTATION: RIGHT

## 2018-07-17 ASSESSMENT — PAIN SCALES - WONG BAKER: WONGBAKER_NUMERICALRESPONSE: 2

## 2018-07-17 ASSESSMENT — PAIN DESCRIPTION - FREQUENCY: FREQUENCY: INTERMITTENT

## 2018-07-17 NOTE — CARE COORDINATION
CASE MANAGEMENT INITIAL ASSESSMENT      Reviewed chart and met with patient today, re: d/c planning needs  Explained Case Management role/services. Yes    Family present: Yes, spouse, Tonja Ansari  Primary contact information: ph:  [phone]    Admit date/status: 7/14/18  Diagnosis: Pneumonia    Insurance: [facility] Medicare  Precert required for SNF - Y       3 night stay required - N    Living arrangements, Adls, care needs, prior to admission: Prior to this admission, pt was staying @ [facility] for short term rehab. Pt is now planning to return home post d/c. Transportation: private car vs [address] at home: RWalker -yes_Cane- yes  RTS__ BSC__Shower Chair__Left Chair - yes  02_- Yes thru the [place] (and they use [facility])  HHN - unknown at this time    Services in the home and/or outpatient, prior to admission: Pt/spouse state pt has been active with [facility] in the past and they would like to use them again if possible. PT/OT recs: PT/OT - home with PT/OT    Hospital Exemption Notification (HEN): N/A    Barriers to discharge: None    Plan/comments: Pt plans to d/c home when stable as above. Message left for [facility] liaison for [facility] services. Pt/spouse deny having any other needs or concerns. ECOC on chart for MD signature- will also need order for [facility] services.

## 2018-07-17 NOTE — PROGRESS NOTES
Physical Exam Performed:  /75   Pulse 110   Temp 97.7 °F (36.5 °C) (Oral)   Resp 16   Ht 5' 8\" (1.727 m)   Wt 155 lb (70.3 kg)   SpO2 94%   BMI 23.57 kg/m²     General appearance: No apparent distress, appears stated age and cooperative. HEENT: Pupils equal, round, and reactive to light. Conjunctivae/corneas clear. Goiter   Neck: Supple, with full range of motion. No jugular venous distention. Trachea midline. Respiratory: HALL minimal, bilaterally decreased 4 luiter per nasal cannula gets dusky around mouth with activity   Cardiovascular: Regular rate and rhythm with normal S1/S2 without murmurs, rubs or gallops. Abdomen: Soft, non-tender, non-distended with normal bowel sounds. Musculoskeletal: No clubbing, cyanosis or edema bilaterally. Full range of motion without deformity. Skin: Skin color pale, texture, turgor normal.  No rashes or lesions. Neurologic:  Neurovascularly intact without any focal sensory/motor deficits. Cranial nerves: II-XII intact, grossly non-focal.  Psychiatric: Alert and oriented, thought content appropriate, normal insight  Capillary Refill: Brisk,< 3 seconds   Peripheral Pulses: +2 palpable, equal bilaterally       Labs:   Recent Labs      07/15/18   0655  07/16/18   0412  07/17/18   0558   WBC  6.8  7.1  8.3   HGB  13.3*  12.2*  13.0*   HCT  38.5*  35.5*  38.3*   PLT  196  168  162     Recent Labs      07/15/18   0655  07/16/18   0412  07/17/18   0559   NA  139  139  138   K  3.9  4.0  4.3   CL  101  104  102   CO2  30  27  26   BUN  11  10  9   CREATININE  0.7*  0.7*  0.6*   CALCIUM  8.8  8.4  9.1     Radiology:  Fluoroscopy modified barium swallow with video   Final Result   No evidence of aspiration. Please see separate speech pathology report for full discussion of findings   and recommendations. US THYROID   Final Result   Unremarkable thyroid ultrasound. XR HIP RIGHT (2-3 VIEWS)   Final Result   No acute osseous abnormality identified. Moderate stool burden with rectal distention. XR CHEST STANDARD (2 VW)   Final Result   1. Consolidative fibrotic changes are seen in the lower lungs ; superimposed   pneumonitis can't be excluded. 2. Calcific atherosclerotic disease aorta. 3. Pulmonary sequela typical of that seen with smoking, including emphysema. Correlate with clinical history. Active Hospital Problems    Diagnosis Date Noted    PNA (pneumonia) [J18.9] 07/14/2018    Aortic stenosis, moderate [I35.0]     Acute exacerbation of chronic obstructive pulmonary disease (COPD) ([facility] [J44.1] 04/17/2015    HTN (hypertension) [I10] 04/17/2015    HLD (hyperlipidemia) [E78.5] 04/17/2015     Assessment/Plan:  Cough/SOB - recent treatment/admission for PNA in the setting of Acute on chronic respiratory failure/ COPD/ ILD  - CXR as above no strong evidence that this is recurrence of PNA. No leukocytosis. More likely he will wax and wane as his underlying respiratory  disease is severe. He is c/o diffiuclty swallowing /  dysphagia cough be contributing   -   MRSA screen - negative, urine antigens for Legionella and streptococcal pneumonia - negative  - Stopped antibiotics given pro calcitonin of 0.11;  supplemental O2 and IHBD. Do  not feel steroids are needed at this time  - monitor  sats   - Palliative care consult - Discussed [address] - Patient Wishes DNI .  Ok with resuscitation  - SLP evaluated with recommendations to obtain MBS - obtained negative for aspiration ,  TSH - 4.96 , thyroid US - Negative     HTN  - controlled, cont meds    HLD  - cont statin     DVT Prophylaxis: lovenox   Diet: DIET CARDIAC;  Code Status: Limited    PT/OT Eval Status: Ordered     Dispo - likely tomorrow  pending clinical improvement    Tanya Beavers MD

## 2018-07-17 NOTE — PROGRESS NOTES
Occupational Therapy   Occupational Therapy Initial Assessment/Treatment Note  Date: 2018   Patient Name: Isai Oden  MRN: [id number]     : 1934    Date of Service: 2018    Discharge Recommendations:  Home with assist PRN, Home with Home health OT  OT Equipment Recommendations  Equipment Needed: No      Patient Diagnosis(es): The primary encounter diagnosis was SOB (shortness of breath). A diagnosis of Tachycardia was also pertinent to this visit. has a past medical history of Aneurysm ([facility] CAD (coronary artery disease); Cancer ([facility] Cancer [facility]); COPD (chronic obstructive pulmonary disease) ([facility] Heart murmur; Hypercholesteremia; Hypertension; and Influenza A.   has a past surgical history that includes Abdominal aortic aneurysm repair; Cholecystectomy, laparoscopic (N/A, 2018); and Upper gastrointestinal endoscopy (2018).            Restrictions  Restrictions/Precautions  Restrictions/Precautions: General Precautions  Position Activity Restriction  Other position/activity restrictions: medium fall risk per nursing, up with assist    Subjective   General  Chart Reviewed: Yes  Patient assessed for rehabilitation services?: Yes  Family / Caregiver Present: Yes (wife)  Referring Practitioner: Nadeen Overton MD  Diagnosis: Increased SOB/cough  Pain Assessment  Patient Currently in Pain: Yes  Pain Assessment: Faces  Vega-Baker Pain Rating: Hurts a little bit  Pain Level: 3  Pain Type: Acute pain  Pain Location: Hip  Pain Orientation: Right  Pain Descriptors: Discomfort  Pain Frequency: Intermittent  Clinical Progression: Not changed  Pain Intervention(s): Repositioned, Emotional support, Rest  Response to Pain Intervention: Patient Satisfied     Social/Functional History  Social/Functional History  Lives With: Spouse  Type of Home: House  Home Layout: Two level, Performs ADL's on one level, Able to Live on Main level with bedroom/bathroom  Home Access: Stairs to enter with rails  Entrance Stairs - Number of Steps: 3  Bathroom Shower/Tub: Walk-in shower (built in shower seat)  Bathroom Toilet: Bedside commode  Bathroom Equipment: Hand-held shower, Grab bars in shower (can grab onto vanity by the toilet)  Home Equipment: [address], Standard walker, 4 wheeled walker, Reacher, Sock aid, Lift chair, Grab bars, Oxygen (wife uses the 4ww)  Receives Help From: Family  ADL Assistance: [address]: Needs assistance (wife assists)  Homemaking Responsibilities: Yes  Ambulation Assistance: Independent  Transfer Assistance: Independent  Active : No  Mode of Transportation: Car  Occupation: Retired       Objective   Vision: Impaired  Vision Exceptions: Wears glasses at all times  Hearing: Exceptions to [facility]  Hearing Exceptions: Hard of hearing/hearing concerns;Bilateral hearing aid    Orientation  Overall Orientation Status: Within Functional Limits     Balance  Sitting Balance: Modified independent   Standing Balance: Supervision  Standing Balance  Time: 1-2 min x3, 2x5 min  Activity: sit <> stand (from EOB, to/from toilet, to chair), amb in hallway and room/toilet  Sit to stand: Supervision  Stand to sit: Supervision  Comment: use of RW for support  Functional Mobility  Functional - Mobility Device: Rolling Walker  Activity: To/from bathroom; Other  Assist Level: Supervision  Functional Mobility Comments: use of RW, 2 seated rest breaks  Toilet Transfers  Toilet - Technique: Ambulating  Equipment Used: Standard toilet  Toilet Transfer: Supervision  Toilet Transfers Comments: use of grab bar to simulate using home vanity for support  ADL  Feeding: Beverage management;Setup  Toileting: Stand by assistance  Additional Comments: good balance/safety, needed min verbal cues for safety and hand placement        Bed mobility  Supine to Sit: Modified independent  Scooting: Modified independent  Comment: no rail, increased time  Transfers  Sit to stand: Supervision  Stand to sit:

## 2018-07-17 NOTE — PLAN OF CARE
Problem: Falls - Risk of:  Goal: Will remain free from falls  Will remain free from falls   Outcome: Ongoing  Patient will remain free of falls. Bed wheels locked, bed in lowest position, and side rails 2/4  Nonskid footwear and fall bracelet applied. Patient oriented to the room and call light. Patient verbalizes understanding to call nurse when getting out of bed.  Bed alarm remains in place and activated.

## 2018-07-17 NOTE — PLAN OF CARE
Problem: Musculor/Skeletal Functional Status  Intervention: PT Evaluation/treatment  PT eval complete, goal to return to baseline function

## 2018-07-17 NOTE — PROGRESS NOTES
Physical Therapy    Facility/Department: [place] [address] - MED SURG/ORTHO  Initial Assessment    NAME: Javi Malone  : 1934  MRN: [id number]    Date of Service: 2018    Discharge Recommendations:  24 hour supervision or assist, Home with Home health PT        Patient Diagnosis(es): The primary encounter diagnosis was SOB (shortness of breath). A diagnosis of Tachycardia was also pertinent to this visit. has a past medical history of Aneurysm ([facility] [address] CAD (coronary artery disease); Cancer ([facility] Cancer [facility]); COPD (chronic obstructive pulmonary disease) ([facility] Heart murmur; Hypercholesteremia; Hypertension; and Influenza A.   has a past surgical history that includes Abdominal aortic aneurysm repair; Cholecystectomy, laparoscopic (N/A, 2018); and Upper gastrointestinal endoscopy (2018). Restrictions  Restrictions/Precautions: General Precautions  Other position/activity restrictions: medium fall risk per nursing, up with assist  Vision/Hearing  Vision: Impaired  Vision Exceptions: Wears glasses at all times  Hearing: Exceptions to [facility]  Hearing Exceptions: Hard of hearing/hearing concerns;Bilateral hearing aid     Subjective  Chart Reviewed: Yes  Patient assessed for rehabilitation services?: Yes  Family / Caregiver Present: Yes (wife Carlotta Dockery)  Referring Practitioner: Praag Da Silva  Referral Date : 18  Diagnosis: PNA, resp failure  Follows Commands: Within Functional Limits  Comments: RN cleared pt for therapy, pt resting in bed on approach.  Grandson and his family visiting but left as PT entered  Subjective: Agrees to therapy, was at [facility] but hopes to go home upon discharge from hospital  Patient Currently in Pain: Yes  Vega-Gray Pain Rating: Hurts a little bit  Pain Type: Acute pain  Pain Location: Hip  Pain Orientation: Right  Pain Descriptors: Discomfort  Pain Frequency: Intermittent  Pain Onset: On-going  Clinical Progression: Not changed  Pain Intervention(s): Repositioned; Emotional support; Rest  Response to Pain Intervention: Patient Satisfied  Pre Treatment Pain Screening  Intervention List: Patient able to continue with treatment    Orientation  Overall Orientation Status: Within Normal Limits    Social/Functional History  Social/Functional History  Lives With: Spouse  Type of Home: House  Home Layout: Two level, Performs ADL's on one level, Able to Live on Main level with bedroom/bathroom  Home Access: Stairs to enter with rails  Entrance Stairs - Number of Steps: 3  Bathroom Shower/Tub: Walk-in shower (built in shower seat)  Bathroom Toilet: Bedside commode  Bathroom Equipment: Hand-held shower, Grab bars in shower (can grab onto vanity by the toilet)  Home Equipment: Cane, Standard walker, 4 wheeled walker, Reacher, Sock aid, Lift chair, Grab bars, Oxygen (wife uses the 4ww)  Receives Help From: Family  ADL Assistance: Independent  Homemaking Assistance: Needs assistance (wife assists)  Homemaking Responsibilities: Yes  Ambulation Assistance: Independent  Transfer Assistance: Independent  Active : No  Mode of Transportation: Car  Occupation: Retired  Objective     AROM RLE (degrees)  RLE AROM: WFL  AROM LLE (degrees)  LLE AROM : WFL  Strength : BLEs grossly 3+/5 at hip, 4+/5 knee and ankle   Bed mobility  Supine to Sit: Modified independent  Transfers  Sit to Stand: Modified independent  Stand to sit: Modified independent  Comment: pushes from bed to arise, uses grab bars in restroom from toilet. Able to arise from bench in hallway pushing from flat bench  Ambulation  Surface: level tile  Device: Rolling Walker  Other Apparatus: O2 (4L)  Assistance: Supervision  Quality of Gait: pt maintains slow pace to conserve energy, appropriate use of RW, no loss of balance  Distance: 60 ft x 2 with seated rest between trials  Comments: Pt was able to go up and down a 6inch curb step holding counter on L and Rail on R.  Pt reports grandson will build ramp to home, meanwhile son and grandson can assist him into home     Balance  Sitting - Static: Good  Standing - Static: Good  Exercises  Straight Leg Raise: 5 x B  Heelslides: 5 x B  Hip Abduction: 5 x B  Ankle Pumps: 10 x B     Assessment   Body structures, Functions, Activity limitations: Decreased functional mobility ; Decreased endurance;Decreased strength  Assessment: Pt adm from SNF with SOB and cough. Hx Ca, COPD, chronic back pain. Wife present during session. Pt and wife state goal of discharging home rather than to SNF again. Agreeable to home PT. Prior to illness was amb indep at home and doing ADLs indep. Today modified indep to exit bed and to stand from bed/bench/toilet. Pt amb with RW 60 ft x 2 on 4L O2 modified indep with good awareness of energy conservation. Able to go up and down a curb step with BUE support and CG. Son and grandson can assist pt into home and plan to build ramp. Wife home 24/7. Recommend home 24 hr assist and home PT  Treatment Diagnosis: weakness, decreased gait and endurance  Specific instructions for Next Treatment: BLE Ex, gait, stairs  Prognosis: Good  Decision Making: Medium Complexity  Patient Education: role of PT, safety, energy conservation  Barriers to Learning: pt and wife voice understanding  REQUIRES PT FOLLOW UP: Yes  Activity Tolerance  Activity Tolerance: Patient Tolerated treatment well;Patient limited by endurance         Plan   Times per week: 3-5 x week  Times per day: Daily  Specific instructions for Next Treatment: BLE Ex, gait, stairs  Current Treatment Recommendations: Strengthening, Endurance Training, Gait Training, Stair training, Safety Education & Training  Safety Devices  Type of devices:  All fall risk precautions in place, Patient at risk for falls, Gait belt, Call light within reach, Left in chair, Chair alarm in place    G-Code  PT G-Codes  Functional Assessment Tool Used: AM PAC  Score: 23  Functional Limitation: Mobility: Walking and moving

## 2018-07-18 VITALS
HEIGHT: 68 IN | SYSTOLIC BLOOD PRESSURE: 134 MMHG | BODY MASS INDEX: 23.49 KG/M2 | WEIGHT: 155 LBS | RESPIRATION RATE: 17 BRPM | HEART RATE: 95 BPM | DIASTOLIC BLOOD PRESSURE: 77 MMHG | TEMPERATURE: 97.6 F | OXYGEN SATURATION: 98 %

## 2018-07-18 LAB
ANION GAP SERPL CALCULATED.3IONS-SCNC: 7 MMOL/L (ref 3–16)
BUN BLDV-MCNC: 10 MG/DL (ref 7–20)
CALCIUM SERPL-MCNC: 9.1 MG/DL (ref 8.3–10.6)
CHLORIDE BLD-SCNC: 101 MMOL/L (ref 99–110)
CO2: 31 MMOL/L (ref 21–32)
CREAT SERPL-MCNC: 0.7 MG/DL (ref 0.8–1.3)
GFR AFRICAN AMERICAN: >60
GFR NON-AFRICAN AMERICAN: >60
GLUCOSE BLD-MCNC: 95 MG/DL (ref 70–99)
HCT VFR BLD CALC: 39 % (ref 40.5–52.5)
HEMOGLOBIN: 13.3 G/DL (ref 13.5–17.5)
MCH RBC QN AUTO: 30.7 PG (ref 26–34)
MCHC RBC AUTO-ENTMCNC: 34 G/DL (ref 31–36)
MCV RBC AUTO: 90.1 FL (ref 80–100)
PDW BLD-RTO: 13.8 % (ref 12.4–15.4)
PLATELET # BLD: 167 K/UL (ref 135–450)
PMV BLD AUTO: 7.9 FL (ref 5–10.5)
POTASSIUM REFLEX MAGNESIUM: 5.2 MMOL/L (ref 3.5–5.1)
RBC # BLD: 4.33 M/UL (ref 4.2–5.9)
REASON FOR REJECTION: NORMAL
REJECTED TEST: NORMAL
SODIUM BLD-SCNC: 139 MMOL/L (ref 136–145)
WBC # BLD: 6.6 K/UL (ref 4–11)

## 2018-07-18 PROCEDURE — 94640 AIRWAY INHALATION TREATMENT: CPT

## 2018-07-18 PROCEDURE — 6370000000 HC RX 637 (ALT 250 FOR IP): Performed by: INTERNAL MEDICINE

## 2018-07-18 PROCEDURE — 2580000003 HC RX 258: Performed by: INTERNAL MEDICINE

## 2018-07-18 PROCEDURE — 2700000000 HC OXYGEN THERAPY PER DAY

## 2018-07-18 PROCEDURE — 6360000002 HC RX W HCPCS: Performed by: INTERNAL MEDICINE

## 2018-07-18 PROCEDURE — 36415 COLL VENOUS BLD VENIPUNCTURE: CPT

## 2018-07-18 PROCEDURE — 92526 ORAL FUNCTION THERAPY: CPT

## 2018-07-18 PROCEDURE — 6370000000 HC RX 637 (ALT 250 FOR IP): Performed by: PEDIATRICS

## 2018-07-18 PROCEDURE — 80048 BASIC METABOLIC PNL TOTAL CA: CPT

## 2018-07-18 PROCEDURE — 85027 COMPLETE CBC AUTOMATED: CPT

## 2018-07-18 PROCEDURE — 94761 N-INVAS EAR/PLS OXIMETRY MLT: CPT

## 2018-07-18 RX ADMIN — IPRATROPIUM BROMIDE AND ALBUTEROL SULFATE 1 AMPULE: .5; 3 SOLUTION RESPIRATORY (INHALATION) at 16:20

## 2018-07-18 RX ADMIN — ACETAMINOPHEN 650 MG: 325 TABLET, FILM COATED ORAL at 17:25

## 2018-07-18 RX ADMIN — NYSTATIN 500000 UNITS: 100000 SUSPENSION ORAL at 10:20

## 2018-07-18 RX ADMIN — ATORVASTATIN CALCIUM 10 MG: 10 TABLET, FILM COATED ORAL at 10:19

## 2018-07-18 RX ADMIN — NYSTATIN 500000 UNITS: 100000 SUSPENSION ORAL at 15:46

## 2018-07-18 RX ADMIN — IPRATROPIUM BROMIDE AND ALBUTEROL SULFATE 1 AMPULE: .5; 3 SOLUTION RESPIRATORY (INHALATION) at 13:09

## 2018-07-18 RX ADMIN — PANTOPRAZOLE SODIUM 40 MG: 40 TABLET, DELAYED RELEASE ORAL at 15:46

## 2018-07-18 RX ADMIN — PANTOPRAZOLE SODIUM 40 MG: 40 TABLET, DELAYED RELEASE ORAL at 08:43

## 2018-07-18 RX ADMIN — NYSTATIN 500000 UNITS: 100000 SUSPENSION ORAL at 13:07

## 2018-07-18 RX ADMIN — Medication 10 ML: at 10:31

## 2018-07-18 RX ADMIN — TAMSULOSIN HYDROCHLORIDE 0.4 MG: 0.4 CAPSULE ORAL at 10:30

## 2018-07-18 RX ADMIN — ENOXAPARIN SODIUM 40 MG: 40 INJECTION, SOLUTION INTRAVENOUS; SUBCUTANEOUS at 10:20

## 2018-07-18 RX ADMIN — IPRATROPIUM BROMIDE AND ALBUTEROL SULFATE 1 AMPULE: .5; 3 SOLUTION RESPIRATORY (INHALATION) at 08:57

## 2018-07-18 RX ADMIN — DOCUSATE SODIUM 100 MG: 100 CAPSULE, LIQUID FILLED ORAL at 10:19

## 2018-07-18 RX ADMIN — POLYETHYLENE GLYCOL (3350) 17 G: 17 POWDER, FOR SOLUTION ORAL at 10:19

## 2018-07-18 RX ADMIN — ACETAMINOPHEN 650 MG: 325 TABLET, FILM COATED ORAL at 10:30

## 2018-07-18 RX ADMIN — ASPIRIN 325 MG: 325 TABLET ORAL at 10:20

## 2018-07-18 ASSESSMENT — PAIN SCALES - GENERAL
PAINLEVEL_OUTOF10: 2
PAINLEVEL_OUTOF10: 2
PAINLEVEL_OUTOF10: 5
PAINLEVEL_OUTOF10: 0

## 2018-07-18 NOTE — PROGRESS NOTES
RESPIRATORY THERAPY ASSESSMENT    Name:  Chapincito Baker  Medical Record Number:  [id number]  Age: 80 y.o. Gender: male  : 1934  Today's Date:  2018  Room:  3226/6979-25    Assessment     Is the patient being admitted for a COPD or Asthma exacerbation? No   (If yes the patient will be seen every 4 hours for the first 24 hours and then reassessed)    Patient Admission Diagnosis      Allergies  No Known Allergies    Minimum Predicted Vital Capacity:     1026          Actual Vital Capacity:      1500          Pulmonary History:COPD and former smoker  Home Oxygen Therapy:  4 liters/min via nasal cannula  Home Respiratory Therapy:Albuterol, Tiotropium Bromide and Vilanterol/Fluticasone Furoate (per chart, pt unsure of home medication names)  Current Respiratory Therapy:  duoneb q4w/a  Treatment Type: HHN, IS  Medications: Albuterol/Ipratropium    Respiratory Severity Index(RSI)   Patients with orders for inhalation medications, oxygen, or any therapeutic treatment modality will be placed on Respiratory Protocol. They will be assessed with the first treatment and at least every 72 hours thereafter. The following severity scale will be used to determine frequency of treatment intervention.     Smoking History: Pulmonary Disease or Smoking History, Greater than 15 pack year = 2    Social History  Social History   Substance Use Topics    Smoking status: Former Smoker     Quit date: 1980    Smokeless tobacco: Never Used    Alcohol use No       Recent Surgical History: None = 0  Past Surgical History  Past Surgical History:   Procedure Laterality Date    ABDOMINAL AORTIC ANEURYSM REPAIR      CHOLECYSTECTOMY, LAPAROSCOPIC N/A 2018    UPPER GASTROINTESTINAL ENDOSCOPY  2018    small tear seen in esophagus       Level of Consciousness: Alert, Oriented, and Cooperative = 0    Level of Activity: Mostly sedentary, minimal walking = 2    Respiratory Pattern: Dyspnea with exertion;Irregular following criteria are met  a. Incognizant or uncooperative          b. Patients treated with HHN at Home        c. Unable to demonstrate proper use of MDI with spacer     d. RR > 30 bpm   5. Bronchodilators will be delivered via Metered Dose Inhaler (MDI), HHN, Aerogen to intubated patients on mechanical ventilation. 6. Inhalation medication orders will be delivered and/or substituted as outlined below. Aerosolized Medications Ordering and Administration Guidelines:    1. All Medications will be ordered by a physician, and their frequency and/or modality will be adjusted as defined by the patients Respiratory Severity Index (RSI) score. 2. If the patient does not have documented COPD, consider discontinuing anticholinergics when RSI is less than 9.  3. If the bronchospasm worsens (increased RSI), then the bronchodilator frequency can be increased to a maximum of every 4 hours. If greater than every 4 hours is required, the physician will be contacted. 4. If the bronchospasm improves, the frequency of the bronchodilator can be decreased, based on the patient's RSI, but not less than home treatment regimen frequency. 5. Bronchodilator(s) will be discontinued if patient has a RSI less than 9 and has received no scheduled or as needed treatment for 72  Hrs. Patients Ordered on a Mucolytic Agent:    1. Must always be administered with a bronchodilator. 2. Discontinue if patient experiences worsened bronchospasm, or secretions have lessened to the point that the patient is able to clear them with a cough. Anti-inflammatory and Combination Medications:    1. If the patient lacks prior history of lung disease, is not using inhaled anti-inflammatory medication at home, and lacks wheezing by examination or by history for at least 24 hours, contact physician for possible discontinuation.

## 2018-07-18 NOTE — DISCHARGE SUMMARY
Hospital Medicine Discharge Summary    Patient ID: Phan Slaughter      Patient's PCP: Leila Rutledge    Admit Date: 7/14/2018     Discharge Date:  07/18/18    Admitting Physician: Masood Garcia MD     Discharge Physician: Nay Valencia MD     Discharge Diagnoses: Active Hospital Problems    Diagnosis Date Noted    PNA (pneumonia) [J18.9] 07/14/2018    Aortic stenosis, moderate [I35.0]     Acute exacerbation of chronic obstructive pulmonary disease (COPD) ([facility] [address] [J44.1] 04/17/2015    HTN (hypertension) [I10] 04/17/2015    HLD (hyperlipidemia) [E78.5] 04/17/2015       The patient was seen and examined on day of discharge and this discharge summary is in conjunction with any daily progress note from day of discharge. History Of Present Illness:     80 y.o. male who presented to [facility] with C/o  Cough and SOB . He was recently discharged from the hospital (6/30- 7/5)  after being treated for pneumonia and respiratory failure. Currently he is getting rehabilitation at the skilled nursing facility ([facility])  . He developed shortness of breath and cough for last couple of days and his oxygen need has gone up. So,  he was sent over here for further E/M . Currently he is comfortable on 5 liter oxygen. He does have cough with some sputum. There is no fever. No change in mental status, chest pain, nausea vomiting or diarrhea. His appetite is not that great. He cannot participate in rehabilitation because of increasing shortness of breath. He got admitted to the hospital for further management as follows    Hospital Course: By Problem List   Cough/SOB - recent treatment/admission for PNA in the setting of Acute on chronic respiratory failure/ COPD/ ILD  - CXR on admission  no strong evidence that this is recurrence of PNA. No leukocytosis. More likely he will wax and wane as his underlying respiratory  disease is severe.  He is c/o diffiuclty swallowing /  dysphagia cough be contributing

## 2018-07-18 NOTE — PROGRESS NOTES
fast but will try to start slowing down and being more cognizant of implementing swallowing strategies. Goals:    Long Term:   Timeframe for Long-term Goals: 1 week  Goal 1: Pt will tolerate safest and least restrictive diet without any clinical s/s of aspiration. 07/18- ongoing: goal not directly addressed today, but per chart review, speaking with RN, and speaking with both pt and his wife- reported no difficulties with current diet recommendations. Short Term:  Dysphagia Goals:  1. The patient will tolerate recommended diet without observed clinical signs of aspiration. 3443: ongoing- not directly addressed today, see above. 2. The patient will tolerate thin liquids without signs and symptoms of aspiration 10/10 via straw. 07/18- not directly addressed today. 3. The patient will tolerate regular consistency solids 10/10. 07/18: ongoing- not directly addressed today. 4. The patient/caregiver will demonstrate understanding of compensatory strategies for improved swallowing safety. 07/18: ongoing- pt and pt's wife both verbalized importance of implementing strategies.       Recommendations:  Continue Regular texture diet with thin liquids    Patient/Family/Caregiver Education:  See above. Compensatory Strategies:  · Alternate solids and liquids  · Eat/Feed slowly  · Upright as possible for all oral intake  · Remain upright for 30-45 minutes after meals  · Small bites/sips  · Swallow 2 times per bite/sip    Plan:    Continued Dysphagia treatment with goals per plan of care. Discharge Recommendations: ST is not warranted at d/c. If pt discharges from hospital prior to Speech/Swallowing discharge, this note serves as tx and discharge summary. Total Treatment Time:  Dysphagia Tx 10\" (9114-0004)      Shawnee RODRIGUEZ CCC-SLP  Speech-Language Pathologist  [id number]

## 2018-07-18 NOTE — FLOWSHEET NOTE
Bedside report given by Hal Stallworth, Skin assessment completed. Shift assessment completed and documented; see flowsheets for details. Pt resting in bed, VSS, Lines checked and verified, alarms on and audible. Repositioned patient, sacral Mepilex in place, call light within reach, will continue to closely monitor.

## 2018-07-18 NOTE — CARE COORDINATION
[facility]    Spoke with pt and wife at bedside about [facility], all questions answered. Demographics verified. Orders faxed to [facility].   Lucy Mcgill RN CTN with Jak Marx [address]  ZVD:[phone]

## 2018-07-19 LAB
BLOOD CULTURE, ROUTINE: NORMAL
CULTURE, BLOOD 2: NORMAL

## 2018-07-25 ENCOUNTER — APPOINTMENT (OUTPATIENT)
Dept: GENERAL RADIOLOGY | Age: 83
DRG: 871 | End: 2018-07-25
Payer: MEDICARE

## 2018-07-25 ENCOUNTER — APPOINTMENT (OUTPATIENT)
Dept: CT IMAGING | Age: 83
DRG: 871 | End: 2018-07-25
Payer: MEDICARE

## 2018-07-25 ENCOUNTER — HOSPITAL ENCOUNTER (INPATIENT)
Age: 83
LOS: 6 days | Discharge: HOME | DRG: 871 | End: 2018-07-31
Attending: EMERGENCY MEDICINE | Admitting: HOSPITALIST
Payer: MEDICARE

## 2018-07-25 DIAGNOSIS — J44.1 COPD EXACERBATION (HCC): Primary | ICD-10-CM

## 2018-07-25 DIAGNOSIS — C80.1 CANCER (HCC): ICD-10-CM

## 2018-07-25 DIAGNOSIS — J18.9 PNEUMONIA OF LEFT LOWER LOBE DUE TO INFECTIOUS ORGANISM: ICD-10-CM

## 2018-07-25 DIAGNOSIS — T85.518S CHOLECYSTOSTOMY TUBE DYSFUNCTION, SEQUELA: ICD-10-CM

## 2018-07-25 DIAGNOSIS — R09.02 HYPOXIA: ICD-10-CM

## 2018-07-25 DIAGNOSIS — I72.9 ANEURYSM (HCC): ICD-10-CM

## 2018-07-25 PROBLEM — J44.9 COPD (CHRONIC OBSTRUCTIVE PULMONARY DISEASE) (HCC): Chronic | Status: ACTIVE | Noted: 2018-02-18

## 2018-07-25 PROBLEM — I49.8 VENTRICULAR TRIGEMINY: Status: ACTIVE | Noted: 2018-07-25

## 2018-07-25 PROBLEM — I27.20 PULMONARY HYPERTENSION (HCC): Chronic | Status: ACTIVE | Noted: 2018-07-25

## 2018-07-25 PROBLEM — J96.11 CHRONIC HYPOXEMIC RESPIRATORY FAILURE (HCC): Chronic | Status: ACTIVE | Noted: 2018-07-25

## 2018-07-25 PROBLEM — E87.8 HYPOCHLOREMIA: Status: ACTIVE | Noted: 2018-07-25

## 2018-07-25 PROBLEM — E44.0 MODERATE MALNUTRITION (HCC): Status: RESOLVED | Noted: 2018-06-30 | Resolved: 2018-07-25

## 2018-07-25 PROBLEM — R79.1 ELEVATED INR: Status: ACTIVE | Noted: 2018-07-25

## 2018-07-25 PROBLEM — J84.10 PULMONARY FIBROSIS (HCC): Chronic | Status: ACTIVE | Noted: 2018-07-25

## 2018-07-25 PROBLEM — K21.9 GERD (GASTROESOPHAGEAL REFLUX DISEASE): Chronic | Status: ACTIVE | Noted: 2018-07-25

## 2018-07-25 PROBLEM — K81.0 ACUTE CHOLECYSTITIS: Status: RESOLVED | Noted: 2017-06-22 | Resolved: 2018-07-25

## 2018-07-25 PROBLEM — A41.9 SEPSIS (HCC): Status: ACTIVE | Noted: 2018-06-30

## 2018-07-25 PROBLEM — R33.9 URINARY RETENTION: Status: RESOLVED | Noted: 2018-06-30 | Resolved: 2018-07-25

## 2018-07-25 PROBLEM — I50.32 CHRONIC DIASTOLIC CHF (CONGESTIVE HEART FAILURE) (HCC): Chronic | Status: ACTIVE | Noted: 2018-07-25

## 2018-07-25 PROBLEM — I95.9 HYPOTENSION: Status: ACTIVE | Noted: 2018-07-25

## 2018-07-25 PROBLEM — N40.0 BPH (BENIGN PROSTATIC HYPERPLASIA): Chronic | Status: ACTIVE | Noted: 2018-07-25

## 2018-07-25 PROBLEM — N30.01 ACUTE CYSTITIS WITH HEMATURIA: Status: RESOLVED | Noted: 2018-06-30 | Resolved: 2018-07-25

## 2018-07-25 PROBLEM — R79.89 ELEVATED BRAIN NATRIURETIC PEPTIDE (BNP) LEVEL: Status: ACTIVE | Noted: 2018-07-25

## 2018-07-25 LAB
A/G RATIO: 0.9 (ref 1.1–2.2)
ALBUMIN SERPL-MCNC: 3.4 G/DL (ref 3.4–5)
ALP BLD-CCNC: 142 U/L (ref 40–129)
ALT SERPL-CCNC: 15 U/L (ref 10–40)
ANION GAP SERPL CALCULATED.3IONS-SCNC: 9 MMOL/L (ref 3–16)
AST SERPL-CCNC: 34 U/L (ref 15–37)
BASE EXCESS VENOUS: 0.5 MMOL/L (ref -3–3)
BASOPHILS ABSOLUTE: 0 K/UL (ref 0–0.2)
BASOPHILS RELATIVE PERCENT: 0.3 %
BILIRUB SERPL-MCNC: 0.8 MG/DL (ref 0–1)
BUN BLDV-MCNC: 19 MG/DL (ref 7–20)
CALCIUM SERPL-MCNC: 9 MG/DL (ref 8.3–10.6)
CARBOXYHEMOGLOBIN: 2.6 % (ref 0–1.5)
CHLORIDE BLD-SCNC: 94 MMOL/L (ref 99–110)
CO2: 28 MMOL/L (ref 21–32)
CREAT SERPL-MCNC: 0.8 MG/DL (ref 0.8–1.3)
EOSINOPHILS ABSOLUTE: 0 K/UL (ref 0–0.6)
EOSINOPHILS RELATIVE PERCENT: 0.2 %
GFR AFRICAN AMERICAN: >60
GFR NON-AFRICAN AMERICAN: >60
GLOBULIN: 3.7 G/DL
GLUCOSE BLD-MCNC: 113 MG/DL (ref 70–99)
HCO3 VENOUS: 26.3 MMOL/L (ref 23–29)
HCT VFR BLD CALC: 40.6 % (ref 40.5–52.5)
HEMOGLOBIN: 13.7 G/DL (ref 13.5–17.5)
INR BLD: 1.3 (ref 0.86–1.14)
LACTIC ACID: 2 MMOL/L (ref 0.4–2)
LYMPHOCYTES ABSOLUTE: 0.7 K/UL (ref 1–5.1)
LYMPHOCYTES RELATIVE PERCENT: 6.2 %
MCH RBC QN AUTO: 30.5 PG (ref 26–34)
MCHC RBC AUTO-ENTMCNC: 33.7 G/DL (ref 31–36)
MCV RBC AUTO: 90.6 FL (ref 80–100)
METHEMOGLOBIN VENOUS: 0.5 %
MONOCYTES ABSOLUTE: 0.7 K/UL (ref 0–1.3)
MONOCYTES RELATIVE PERCENT: 6.2 %
NEUTROPHILS ABSOLUTE: 10.1 K/UL (ref 1.7–7.7)
NEUTROPHILS RELATIVE PERCENT: 87.1 %
O2 CONTENT, VEN: 12 VOL %
O2 SAT, VEN: 59 %
O2 THERAPY: ABNORMAL
PCO2, VEN: 46.7 MMHG (ref 40–50)
PDW BLD-RTO: 14.1 % (ref 12.4–15.4)
PH VENOUS: 7.37 (ref 7.35–7.45)
PLATELET # BLD: 177 K/UL (ref 135–450)
PMV BLD AUTO: 7.8 FL (ref 5–10.5)
PO2, VEN: 31.6 MMHG (ref 25–40)
POTASSIUM SERPL-SCNC: 4.9 MMOL/L (ref 3.5–5.1)
PRO-BNP: 1169 PG/ML (ref 0–449)
PROTHROMBIN TIME: 14.8 SEC (ref 9.8–13)
RBC # BLD: 4.48 M/UL (ref 4.2–5.9)
SODIUM BLD-SCNC: 131 MMOL/L (ref 136–145)
SPECIMEN STATUS: NORMAL
TCO2 CALC VENOUS: 28 MMOL/L
TOTAL PROTEIN: 7.1 G/DL (ref 6.4–8.2)
TROPONIN: <0.01 NG/ML
WBC # BLD: 11.6 K/UL (ref 4–11)

## 2018-07-25 PROCEDURE — 83735 ASSAY OF MAGNESIUM: CPT

## 2018-07-25 PROCEDURE — 87205 SMEAR GRAM STAIN: CPT

## 2018-07-25 PROCEDURE — 87070 CULTURE OTHR SPECIMN AEROBIC: CPT

## 2018-07-25 PROCEDURE — 6360000002 HC RX W HCPCS: Performed by: EMERGENCY MEDICINE

## 2018-07-25 PROCEDURE — 96367 TX/PROPH/DG ADDL SEQ IV INF: CPT

## 2018-07-25 PROCEDURE — 2580000003 HC RX 258: Performed by: EMERGENCY MEDICINE

## 2018-07-25 PROCEDURE — 96365 THER/PROPH/DIAG IV INF INIT: CPT

## 2018-07-25 PROCEDURE — 99285 EMERGENCY DEPT VISIT HI MDM: CPT

## 2018-07-25 PROCEDURE — 80053 COMPREHEN METABOLIC PANEL: CPT

## 2018-07-25 PROCEDURE — 87040 BLOOD CULTURE FOR BACTERIA: CPT

## 2018-07-25 PROCEDURE — 93005 ELECTROCARDIOGRAM TRACING: CPT | Performed by: EMERGENCY MEDICINE

## 2018-07-25 PROCEDURE — 86738 MYCOPLASMA ANTIBODY: CPT

## 2018-07-25 PROCEDURE — 6370000000 HC RX 637 (ALT 250 FOR IP): Performed by: EMERGENCY MEDICINE

## 2018-07-25 PROCEDURE — 71275 CT ANGIOGRAPHY CHEST: CPT

## 2018-07-25 PROCEDURE — 6370000000 HC RX 637 (ALT 250 FOR IP): Performed by: HOSPITALIST

## 2018-07-25 PROCEDURE — 2580000003 HC RX 258: Performed by: HOSPITALIST

## 2018-07-25 PROCEDURE — 93010 ELECTROCARDIOGRAM REPORT: CPT | Performed by: INTERNAL MEDICINE

## 2018-07-25 PROCEDURE — 94644 CONT INHLJ TX 1ST HOUR: CPT

## 2018-07-25 PROCEDURE — 85025 COMPLETE CBC W/AUTO DIFF WBC: CPT

## 2018-07-25 PROCEDURE — 2700000000 HC OXYGEN THERAPY PER DAY

## 2018-07-25 PROCEDURE — 83605 ASSAY OF LACTIC ACID: CPT

## 2018-07-25 PROCEDURE — 82803 BLOOD GASES ANY COMBINATION: CPT

## 2018-07-25 PROCEDURE — 71045 X-RAY EXAM CHEST 1 VIEW: CPT

## 2018-07-25 PROCEDURE — 6360000002 HC RX W HCPCS: Performed by: HOSPITALIST

## 2018-07-25 PROCEDURE — 94664 DEMO&/EVAL PT USE INHALER: CPT

## 2018-07-25 PROCEDURE — 6360000004 HC RX CONTRAST MEDICATION: Performed by: EMERGENCY MEDICINE

## 2018-07-25 PROCEDURE — 87641 MR-STAPH DNA AMP PROBE: CPT

## 2018-07-25 PROCEDURE — 2060000000 HC ICU INTERMEDIATE R&B

## 2018-07-25 PROCEDURE — 83880 ASSAY OF NATRIURETIC PEPTIDE: CPT

## 2018-07-25 PROCEDURE — 84484 ASSAY OF TROPONIN QUANT: CPT

## 2018-07-25 PROCEDURE — 94760 N-INVAS EAR/PLS OXIMETRY 1: CPT

## 2018-07-25 PROCEDURE — 36415 COLL VENOUS BLD VENIPUNCTURE: CPT

## 2018-07-25 PROCEDURE — 85610 PROTHROMBIN TIME: CPT

## 2018-07-25 PROCEDURE — 94640 AIRWAY INHALATION TREATMENT: CPT

## 2018-07-25 RX ORDER — METHYLPREDNISOLONE SODIUM SUCCINATE 125 MG/2ML
125 INJECTION, POWDER, LYOPHILIZED, FOR SOLUTION INTRAMUSCULAR; INTRAVENOUS ONCE
Status: COMPLETED | OUTPATIENT
Start: 2018-07-25 | End: 2018-07-25

## 2018-07-25 RX ORDER — PANTOPRAZOLE SODIUM 40 MG/1
40 TABLET, DELAYED RELEASE ORAL
Status: DISCONTINUED | OUTPATIENT
Start: 2018-07-26 | End: 2018-07-31 | Stop reason: HOSPADM

## 2018-07-25 RX ORDER — POTASSIUM CHLORIDE 20MEQ/15ML
40 LIQUID (ML) ORAL PRN
Status: DISCONTINUED | OUTPATIENT
Start: 2018-07-25 | End: 2018-07-30

## 2018-07-25 RX ORDER — TAMSULOSIN HYDROCHLORIDE 0.4 MG/1
0.4 CAPSULE ORAL DAILY
Status: DISCONTINUED | OUTPATIENT
Start: 2018-07-26 | End: 2018-07-31 | Stop reason: HOSPADM

## 2018-07-25 RX ORDER — POTASSIUM CHLORIDE 20 MEQ/1
40 TABLET, EXTENDED RELEASE ORAL PRN
Status: DISCONTINUED | OUTPATIENT
Start: 2018-07-25 | End: 2018-07-30

## 2018-07-25 RX ORDER — IPRATROPIUM BROMIDE AND ALBUTEROL SULFATE 2.5; .5 MG/3ML; MG/3ML
1 SOLUTION RESPIRATORY (INHALATION) EVERY 4 HOURS
Status: DISCONTINUED | OUTPATIENT
Start: 2018-07-25 | End: 2018-07-26

## 2018-07-25 RX ORDER — SODIUM CHLORIDE 0.9 % (FLUSH) 0.9 %
10 SYRINGE (ML) INJECTION EVERY 12 HOURS SCHEDULED
Status: DISCONTINUED | OUTPATIENT
Start: 2018-07-25 | End: 2018-07-31 | Stop reason: HOSPADM

## 2018-07-25 RX ORDER — LEVOFLOXACIN 5 MG/ML
500 INJECTION, SOLUTION INTRAVENOUS EVERY 24 HOURS
Status: DISCONTINUED | OUTPATIENT
Start: 2018-07-25 | End: 2018-07-26

## 2018-07-25 RX ORDER — IPRATROPIUM BROMIDE AND ALBUTEROL SULFATE 2.5; .5 MG/3ML; MG/3ML
1 SOLUTION RESPIRATORY (INHALATION) ONCE
Status: COMPLETED | OUTPATIENT
Start: 2018-07-25 | End: 2018-07-25

## 2018-07-25 RX ORDER — OXYCODONE HYDROCHLORIDE AND ACETAMINOPHEN 5; 325 MG/1; MG/1
1 TABLET ORAL EVERY 8 HOURS PRN
Status: DISCONTINUED | OUTPATIENT
Start: 2018-07-25 | End: 2018-07-31 | Stop reason: HOSPADM

## 2018-07-25 RX ORDER — POTASSIUM CHLORIDE 7.45 MG/ML
10 INJECTION INTRAVENOUS PRN
Status: DISCONTINUED | OUTPATIENT
Start: 2018-07-25 | End: 2018-07-30

## 2018-07-25 RX ORDER — METHYLPREDNISOLONE SODIUM SUCCINATE 40 MG/ML
40 INJECTION, POWDER, LYOPHILIZED, FOR SOLUTION INTRAMUSCULAR; INTRAVENOUS EVERY 12 HOURS
Status: DISCONTINUED | OUTPATIENT
Start: 2018-07-26 | End: 2018-07-26

## 2018-07-25 RX ORDER — MAGNESIUM SULFATE 1 G/100ML
1 INJECTION INTRAVENOUS PRN
Status: DISCONTINUED | OUTPATIENT
Start: 2018-07-25 | End: 2018-07-30

## 2018-07-25 RX ORDER — 0.9 % SODIUM CHLORIDE 0.9 %
500 INTRAVENOUS SOLUTION INTRAVENOUS ONCE
Status: COMPLETED | OUTPATIENT
Start: 2018-07-25 | End: 2018-07-25

## 2018-07-25 RX ORDER — ASPIRIN 325 MG
325 TABLET ORAL DAILY
Status: DISCONTINUED | OUTPATIENT
Start: 2018-07-26 | End: 2018-07-31 | Stop reason: HOSPADM

## 2018-07-25 RX ORDER — ONDANSETRON 2 MG/ML
4 INJECTION INTRAMUSCULAR; INTRAVENOUS EVERY 6 HOURS PRN
Status: DISCONTINUED | OUTPATIENT
Start: 2018-07-25 | End: 2018-07-31 | Stop reason: HOSPADM

## 2018-07-25 RX ORDER — ATORVASTATIN CALCIUM 10 MG/1
10 TABLET, FILM COATED ORAL DAILY
Status: DISCONTINUED | OUTPATIENT
Start: 2018-07-26 | End: 2018-07-31 | Stop reason: HOSPADM

## 2018-07-25 RX ORDER — DOCUSATE SODIUM 100 MG/1
100 CAPSULE, LIQUID FILLED ORAL 2 TIMES DAILY
Status: DISCONTINUED | OUTPATIENT
Start: 2018-07-25 | End: 2018-07-31 | Stop reason: HOSPADM

## 2018-07-25 RX ORDER — BENZONATATE 100 MG/1
100 CAPSULE ORAL 3 TIMES DAILY PRN
Status: DISCONTINUED | OUTPATIENT
Start: 2018-07-25 | End: 2018-07-31 | Stop reason: HOSPADM

## 2018-07-25 RX ORDER — SODIUM CHLORIDE 0.9 % (FLUSH) 0.9 %
10 SYRINGE (ML) INJECTION PRN
Status: DISCONTINUED | OUTPATIENT
Start: 2018-07-25 | End: 2018-07-31 | Stop reason: HOSPADM

## 2018-07-25 RX ORDER — ACETAMINOPHEN 325 MG/1
650 TABLET ORAL EVERY 4 HOURS PRN
Status: DISCONTINUED | OUTPATIENT
Start: 2018-07-25 | End: 2018-07-31 | Stop reason: HOSPADM

## 2018-07-25 RX ORDER — ALBUTEROL SULFATE 90 UG/1
2 AEROSOL, METERED RESPIRATORY (INHALATION) EVERY 4 HOURS PRN
Status: DISCONTINUED | OUTPATIENT
Start: 2018-07-25 | End: 2018-07-31 | Stop reason: HOSPADM

## 2018-07-25 RX ORDER — SODIUM CHLORIDE 9 MG/ML
INJECTION, SOLUTION INTRAVENOUS CONTINUOUS
Status: DISCONTINUED | OUTPATIENT
Start: 2018-07-25 | End: 2018-07-27

## 2018-07-25 RX ORDER — 0.9 % SODIUM CHLORIDE 0.9 %
2000 INTRAVENOUS SOLUTION INTRAVENOUS ONCE
Status: DISCONTINUED | OUTPATIENT
Start: 2018-07-25 | End: 2018-07-26

## 2018-07-25 RX ORDER — M-VIT,TX,IRON,MINS/CALC/FOLIC 27MG-0.4MG
1 TABLET ORAL DAILY
Status: DISCONTINUED | OUTPATIENT
Start: 2018-07-26 | End: 2018-07-31 | Stop reason: HOSPADM

## 2018-07-25 RX ORDER — CYCLOBENZAPRINE HCL 10 MG
5 TABLET ORAL 3 TIMES DAILY PRN
Status: DISCONTINUED | OUTPATIENT
Start: 2018-07-25 | End: 2018-07-31 | Stop reason: HOSPADM

## 2018-07-25 RX ADMIN — IPRATROPIUM BROMIDE AND ALBUTEROL SULFATE 1 AMPULE: .5; 3 SOLUTION RESPIRATORY (INHALATION) at 15:56

## 2018-07-25 RX ADMIN — SODIUM CHLORIDE 500 ML: 9 INJECTION, SOLUTION INTRAVENOUS at 19:49

## 2018-07-25 RX ADMIN — METHYLPREDNISOLONE SODIUM SUCCINATE 125 MG: 125 INJECTION, POWDER, FOR SOLUTION INTRAMUSCULAR; INTRAVENOUS at 22:59

## 2018-07-25 RX ADMIN — ALBUTEROL SULFATE 2.5 MG: 2.5 SOLUTION RESPIRATORY (INHALATION) at 15:57

## 2018-07-25 RX ADMIN — PIPERACILLIN SODIUM AND TAZOBACTAM SODIUM 3.38 G: 3; .375 INJECTION, POWDER, LYOPHILIZED, FOR SOLUTION INTRAVENOUS at 19:49

## 2018-07-25 RX ADMIN — IOPAMIDOL 75 ML: 755 INJECTION, SOLUTION INTRAVENOUS at 19:03

## 2018-07-25 RX ADMIN — VANCOMYCIN HYDROCHLORIDE 1000 MG: 1 INJECTION, POWDER, LYOPHILIZED, FOR SOLUTION INTRAVENOUS at 20:22

## 2018-07-25 RX ADMIN — DOCUSATE SODIUM 100 MG: 100 CAPSULE, LIQUID FILLED ORAL at 22:59

## 2018-07-25 RX ADMIN — ALBUTEROL SULFATE 2.5 MG: 2.5 SOLUTION RESPIRATORY (INHALATION) at 18:13

## 2018-07-25 RX ADMIN — LEVOFLOXACIN 500 MG: 5 INJECTION, SOLUTION INTRAVENOUS at 22:59

## 2018-07-25 RX ADMIN — SODIUM CHLORIDE: 9 INJECTION, SOLUTION INTRAVENOUS at 22:59

## 2018-07-25 RX ADMIN — Medication 10 ML: at 23:00

## 2018-07-25 ASSESSMENT — PAIN SCALES - GENERAL: PAINLEVEL_OUTOF10: 0

## 2018-07-25 NOTE — ED PROVIDER NOTES
Historical Provider, MD   atorvastatin (LIPITOR) 10 MG tablet Take 10 mg by mouth daily    Historical Provider, MD   fluticasone-vilanterol (BREO ELLIPTA) 100-25 MCG/INH AEPB inhaler Inhale 1 puff into the lungs daily    Historical Provider, MD   docusate sodium (COLACE, DULCOLAX) 100 MG CAPS Take 100 mg by mouth 2 times daily 7/5/18   Daphnie Casanova APRN - CNP   magnesium hydroxide (MILK OF MAGNESIA) 400 MG/5ML suspension Take 30 mLs by mouth daily as needed for Constipation 7/5/18   GEORGIA Hamlin - CNP   tamsulosin [facility]) 0.4 MG capsule Take 1 capsule by mouth daily 6/28/18   Maynor Mack MD   pantoprazole (PROTONIX) 40 MG tablet Take 1 tablet by mouth 2 times daily (before meals) 6/27/18   Maynor Mack MD   cyclobenzaprine (FLEXERIL) 5 MG tablet Take 1 tablet by mouth 3 times daily as needed for Muscle spasms 6/27/18   Maynor Mack MD   tiotropium (SPIRIVA) 18 MCG inhalation capsule Inhale 18 mcg into the lungs daily    Historical Provider, MD   benzonatate (TESSALON PERLES) 100 MG capsule Take 100 mg by mouth 3 times daily as needed for Cough    Historical Provider, MD   therapeutic multivitamin-minerals (THERAGRAN-M) tablet Take 1 tablet by mouth daily. Historical Provider, MD   albuterol (PROVENTIL HFA;VENTOLIN HFA) 108 (90 BASE) MCG/ACT inhaler Inhale 2 puffs into the lungs every 6 hours as needed. Historical Provider, MD   aspirin 325 MG tablet Take 325 mg by mouth daily. Historical Provider, MD       Social history:  reports that he quit smoking about 37 years ago. He has never used smokeless tobacco. He reports that he does not drink alcohol or use drugs.     Family history:    Family History   Problem Relation Age of Onset    No Known Problems Mother     No Known Problems Father        Exam  ED Triage Vitals [07/25/18 1430]   BP Temp Temp Source Pulse Resp SpO2 Height Weight   115/69 99.5 °F (37.5 °C) Axillary 72 20 95 % 5' 8\" (1.727 m) 155 lb (70.3 kg) exposure.          Labs  Results for orders placed or performed during the hospital encounter of 07/25/18   CBC Auto Differential   Result Value Ref Range    WBC 11.6 (H) 4.0 - 11.0 K/uL    RBC 4.48 4.20 - 5.90 M/uL    Hemoglobin 13.7 13.5 - 17.5 g/dL    Hematocrit 40.6 40.5 - 52.5 %    MCV 90.6 80.0 - 100.0 fL    MCH 30.5 26.0 - 34.0 pg    MCHC 33.7 31.0 - 36.0 g/dL    RDW 14.1 12.4 - 15.4 %    Platelets 943 068 - 412 K/uL    MPV 7.8 5.0 - 10.5 fL    Neutrophils % 87.1 %    Lymphocytes % 6.2 %    Monocytes % 6.2 %    Eosinophils % 0.2 %    Basophils % 0.3 %    Neutrophils # 10.1 (H) 1.7 - 7.7 K/uL    Lymphocytes # 0.7 (L) 1.0 - 5.1 K/uL    Monocytes # 0.7 0.0 - 1.3 K/uL    Eosinophils # 0.0 0.0 - 0.6 K/uL    Basophils # 0.0 0.0 - 0.2 K/uL   Comprehensive Metabolic Panel   Result Value Ref Range    Sodium 131 (L) 136 - 145 mmol/L    Potassium 4.9 3.5 - 5.1 mmol/L    Chloride 94 (L) 99 - 110 mmol/L    CO2 28 21 - 32 mmol/L    Anion Gap 9 3 - 16    Glucose 113 (H) 70 - 99 mg/dL    BUN 19 7 - 20 mg/dL    CREATININE 0.8 0.8 - 1.3 mg/dL    GFR Non-African American >60 >60    GFR African American >60 >60    Calcium 9.0 8.3 - 10.6 mg/dL    Total Protein 7.1 6.4 - 8.2 g/dL    Alb 3.4 3.4 - 5.0 g/dL    Albumin/Globulin Ratio 0.9 (L) 1.1 - 2.2    Total Bilirubin 0.8 0.0 - 1.0 mg/dL    Alkaline Phosphatase 142 (H) 40 - 129 U/L    ALT 15 10 - 40 U/L    AST 34 15 - 37 U/L    Globulin 3.7 g/dL   Blood Gas, Venous   Result Value Ref Range    pH, Esa 7.369 7.350 - 7.450    pCO2, Sea 46.7 40.0 - 50.0 mmHg    pO2, Sea 31.6 25.0 - 40.0 mmHg    HCO3, Venous 26.3 23.0 - 29.0 mmol/L    Base Excess, Sea 0.5 -3.0 - 3.0 mmol/L    O2 Sat, Sea 59 Not Established %    Carboxyhemoglobin 2.6 (H) 0.0 - 1.5 %    MetHgb, Sea 0.5 <1.5 %    TC02 (Calc), Sea 28 Not Established mmol/L    O2 Content, Sea 12 Not Established VOL %    O2 Therapy Unknown    Brain Natriuretic Peptide   Result Value Ref Range    Pro-BNP 1,169 (H) 0 - 449 pg/mL   Lactic Acid, including errors in grammar, punctuation, and spelling, as well as words and phrases that may be inappropriate. If there are any questions or concerns please feel free to contact the dictating provider for clarification.      Josephine Vazquez MD  15 Gisselle Lopez MD  07/26/18 0691

## 2018-07-26 LAB
ALBUMIN SERPL-MCNC: 3 G/DL (ref 3.4–5)
ALP BLD-CCNC: 124 U/L (ref 40–129)
ALT SERPL-CCNC: 10 U/L (ref 10–40)
ANION GAP SERPL CALCULATED.3IONS-SCNC: 11 MMOL/L (ref 3–16)
AST SERPL-CCNC: 18 U/L (ref 15–37)
BACTERIA: ABNORMAL /HPF
BASOPHILS ABSOLUTE: 0 K/UL (ref 0–0.2)
BASOPHILS RELATIVE PERCENT: 0 %
BILIRUB SERPL-MCNC: 0.7 MG/DL (ref 0–1)
BILIRUBIN DIRECT: 0.3 MG/DL (ref 0–0.3)
BILIRUBIN URINE: NEGATIVE
BILIRUBIN, INDIRECT: 0.4 MG/DL (ref 0–1)
BLOOD, URINE: NEGATIVE
BUN BLDV-MCNC: 15 MG/DL (ref 7–20)
CALCIUM SERPL-MCNC: 9.3 MG/DL (ref 8.3–10.6)
CHLORIDE BLD-SCNC: 98 MMOL/L (ref 99–110)
CLARITY: CLEAR
CO2: 28 MMOL/L (ref 21–32)
COLOR: YELLOW
CREAT SERPL-MCNC: 0.8 MG/DL (ref 0.8–1.3)
EOSINOPHILS ABSOLUTE: 0 K/UL (ref 0–0.6)
EOSINOPHILS RELATIVE PERCENT: 0 %
EPITHELIAL CELLS, UA: ABNORMAL /HPF
GFR AFRICAN AMERICAN: >60
GFR NON-AFRICAN AMERICAN: >60
GLUCOSE BLD-MCNC: 166 MG/DL (ref 70–99)
GLUCOSE URINE: NEGATIVE MG/DL
HCT VFR BLD CALC: 35.7 % (ref 40.5–52.5)
HEMOGLOBIN: 12.2 G/DL (ref 13.5–17.5)
INR BLD: 1.46 (ref 0.86–1.14)
KETONES, URINE: NEGATIVE MG/DL
LACTIC ACID: 1.1 MMOL/L (ref 0.4–2)
LACTIC ACID: 3.1 MMOL/L (ref 0.4–2)
LEUKOCYTE ESTERASE, URINE: NEGATIVE
LYMPHOCYTES ABSOLUTE: 0.3 K/UL (ref 1–5.1)
LYMPHOCYTES RELATIVE PERCENT: 3.7 %
MAGNESIUM: 1.7 MG/DL (ref 1.8–2.4)
MAGNESIUM: 1.9 MG/DL (ref 1.8–2.4)
MCH RBC QN AUTO: 30.8 PG (ref 26–34)
MCHC RBC AUTO-ENTMCNC: 34.3 G/DL (ref 31–36)
MCV RBC AUTO: 89.8 FL (ref 80–100)
MICROSCOPIC EXAMINATION: ABNORMAL
MONOCYTES ABSOLUTE: 0.1 K/UL (ref 0–1.3)
MONOCYTES RELATIVE PERCENT: 1 %
NEUTROPHILS ABSOLUTE: 7.4 K/UL (ref 1.7–7.7)
NEUTROPHILS RELATIVE PERCENT: 95.3 %
NITRITE, URINE: NEGATIVE
PDW BLD-RTO: 14.5 % (ref 12.4–15.4)
PH UA: 6
PHOSPHORUS: 2.9 MG/DL (ref 2.5–4.9)
PLATELET # BLD: 176 K/UL (ref 135–450)
PMV BLD AUTO: 7.8 FL (ref 5–10.5)
POTASSIUM SERPL-SCNC: 4.2 MMOL/L (ref 3.5–5.1)
PRO-BNP: 2877 PG/ML (ref 0–449)
PROCALCITONIN: 0.29 NG/ML (ref 0–0.15)
PROTEIN UA: NEGATIVE MG/DL
PROTHROMBIN TIME: 16.6 SEC (ref 9.8–13)
RBC # BLD: 3.98 M/UL (ref 4.2–5.9)
RBC UA: ABNORMAL /HPF (ref 0–2)
SODIUM BLD-SCNC: 137 MMOL/L (ref 136–145)
SPECIFIC GRAVITY UA: <=1.005
TOTAL PROTEIN: 6.3 G/DL (ref 6.4–8.2)
TROPONIN: <0.01 NG/ML
TROPONIN: <0.01 NG/ML
UROBILINOGEN, URINE: 0.2 E.U./DL
WBC # BLD: 7.7 K/UL (ref 4–11)
WBC UA: ABNORMAL /HPF (ref 0–5)

## 2018-07-26 PROCEDURE — 85610 PROTHROMBIN TIME: CPT

## 2018-07-26 PROCEDURE — 83880 ASSAY OF NATRIURETIC PEPTIDE: CPT

## 2018-07-26 PROCEDURE — 2060000000 HC ICU INTERMEDIATE R&B

## 2018-07-26 PROCEDURE — 80076 HEPATIC FUNCTION PANEL: CPT

## 2018-07-26 PROCEDURE — 84484 ASSAY OF TROPONIN QUANT: CPT

## 2018-07-26 PROCEDURE — 84145 PROCALCITONIN (PCT): CPT

## 2018-07-26 PROCEDURE — 94640 AIRWAY INHALATION TREATMENT: CPT

## 2018-07-26 PROCEDURE — 87449 NOS EACH ORGANISM AG IA: CPT

## 2018-07-26 PROCEDURE — 6370000000 HC RX 637 (ALT 250 FOR IP): Performed by: HOSPITALIST

## 2018-07-26 PROCEDURE — 81001 URINALYSIS AUTO W/SCOPE: CPT

## 2018-07-26 PROCEDURE — 94664 DEMO&/EVAL PT USE INHALER: CPT

## 2018-07-26 PROCEDURE — 85025 COMPLETE CBC W/AUTO DIFF WBC: CPT

## 2018-07-26 PROCEDURE — 94761 N-INVAS EAR/PLS OXIMETRY MLT: CPT

## 2018-07-26 PROCEDURE — 2700000000 HC OXYGEN THERAPY PER DAY

## 2018-07-26 PROCEDURE — 36415 COLL VENOUS BLD VENIPUNCTURE: CPT

## 2018-07-26 PROCEDURE — 84100 ASSAY OF PHOSPHORUS: CPT

## 2018-07-26 PROCEDURE — 99291 CRITICAL CARE FIRST HOUR: CPT | Performed by: INTERNAL MEDICINE

## 2018-07-26 PROCEDURE — 83605 ASSAY OF LACTIC ACID: CPT

## 2018-07-26 PROCEDURE — 2580000003 HC RX 258: Performed by: HOSPITALIST

## 2018-07-26 PROCEDURE — 6360000002 HC RX W HCPCS: Performed by: INTERNAL MEDICINE

## 2018-07-26 PROCEDURE — 6360000002 HC RX W HCPCS: Performed by: HOSPITALIST

## 2018-07-26 PROCEDURE — 94762 N-INVAS EAR/PLS OXIMTRY CONT: CPT

## 2018-07-26 PROCEDURE — 83735 ASSAY OF MAGNESIUM: CPT

## 2018-07-26 PROCEDURE — 80048 BASIC METABOLIC PNL TOTAL CA: CPT

## 2018-07-26 PROCEDURE — 94150 VITAL CAPACITY TEST: CPT

## 2018-07-26 RX ORDER — METHYLPREDNISOLONE SODIUM SUCCINATE 125 MG/2ML
60 INJECTION, POWDER, LYOPHILIZED, FOR SOLUTION INTRAMUSCULAR; INTRAVENOUS EVERY 6 HOURS
Status: DISCONTINUED | OUTPATIENT
Start: 2018-07-26 | End: 2018-07-28

## 2018-07-26 RX ORDER — IPRATROPIUM BROMIDE AND ALBUTEROL SULFATE 2.5; .5 MG/3ML; MG/3ML
1 SOLUTION RESPIRATORY (INHALATION) EVERY 4 HOURS
Status: DISCONTINUED | OUTPATIENT
Start: 2018-07-26 | End: 2018-07-31 | Stop reason: HOSPADM

## 2018-07-26 RX ADMIN — IPRATROPIUM BROMIDE AND ALBUTEROL SULFATE 1 AMPULE: .5; 3 SOLUTION RESPIRATORY (INHALATION) at 04:20

## 2018-07-26 RX ADMIN — IPRATROPIUM BROMIDE AND ALBUTEROL SULFATE 1 AMPULE: .5; 3 SOLUTION RESPIRATORY (INHALATION) at 01:17

## 2018-07-26 RX ADMIN — TAMSULOSIN HYDROCHLORIDE 0.4 MG: 0.4 CAPSULE ORAL at 08:24

## 2018-07-26 RX ADMIN — PANTOPRAZOLE SODIUM 40 MG: 40 TABLET, DELAYED RELEASE ORAL at 16:39

## 2018-07-26 RX ADMIN — METHYLPREDNISOLONE SODIUM SUCCINATE 60 MG: 125 INJECTION, POWDER, FOR SOLUTION INTRAMUSCULAR; INTRAVENOUS at 08:25

## 2018-07-26 RX ADMIN — PANTOPRAZOLE SODIUM 40 MG: 40 TABLET, DELAYED RELEASE ORAL at 08:25

## 2018-07-26 RX ADMIN — DOCUSATE SODIUM 100 MG: 100 CAPSULE, LIQUID FILLED ORAL at 08:24

## 2018-07-26 RX ADMIN — METHYLPREDNISOLONE SODIUM SUCCINATE 60 MG: 125 INJECTION, POWDER, FOR SOLUTION INTRAMUSCULAR; INTRAVENOUS at 19:02

## 2018-07-26 RX ADMIN — Medication 1 TABLET: at 08:24

## 2018-07-26 RX ADMIN — PIPERACILLIN AND TAZOBACTAM 3.38 G: 3; .375 INJECTION, POWDER, FOR SOLUTION INTRAVENOUS at 17:10

## 2018-07-26 RX ADMIN — Medication 10 ML: at 21:05

## 2018-07-26 RX ADMIN — Medication 10 ML: at 08:24

## 2018-07-26 RX ADMIN — IPRATROPIUM BROMIDE AND ALBUTEROL SULFATE 1 AMPULE: .5; 3 SOLUTION RESPIRATORY (INHALATION) at 12:31

## 2018-07-26 RX ADMIN — IPRATROPIUM BROMIDE AND ALBUTEROL SULFATE 1 AMPULE: .5; 3 SOLUTION RESPIRATORY (INHALATION) at 21:22

## 2018-07-26 RX ADMIN — TIOTROPIUM BROMIDE 18 MCG: 18 CAPSULE ORAL; RESPIRATORY (INHALATION) at 08:30

## 2018-07-26 RX ADMIN — DOCUSATE SODIUM 100 MG: 100 CAPSULE, LIQUID FILLED ORAL at 21:05

## 2018-07-26 RX ADMIN — VANCOMYCIN HYDROCHLORIDE 750 MG: 750 INJECTION, POWDER, LYOPHILIZED, FOR SOLUTION INTRAVENOUS at 21:08

## 2018-07-26 RX ADMIN — SODIUM CHLORIDE: 9 INJECTION, SOLUTION INTRAVENOUS at 17:16

## 2018-07-26 RX ADMIN — IPRATROPIUM BROMIDE AND ALBUTEROL SULFATE 1 AMPULE: .5; 3 SOLUTION RESPIRATORY (INHALATION) at 16:00

## 2018-07-26 RX ADMIN — ENOXAPARIN SODIUM 40 MG: 100 INJECTION SUBCUTANEOUS at 08:24

## 2018-07-26 RX ADMIN — ASPIRIN 325 MG: 325 TABLET ORAL at 08:24

## 2018-07-26 RX ADMIN — PIPERACILLIN AND TAZOBACTAM 3.38 G: 3; .375 INJECTION, POWDER, FOR SOLUTION INTRAVENOUS at 02:49

## 2018-07-26 RX ADMIN — PIPERACILLIN AND TAZOBACTAM 3.38 G: 3; .375 INJECTION, POWDER, FOR SOLUTION INTRAVENOUS at 10:40

## 2018-07-26 RX ADMIN — IPRATROPIUM BROMIDE AND ALBUTEROL SULFATE 1 AMPULE: .5; 3 SOLUTION RESPIRATORY (INHALATION) at 08:30

## 2018-07-26 RX ADMIN — METHYLPREDNISOLONE SODIUM SUCCINATE 60 MG: 125 INJECTION, POWDER, FOR SOLUTION INTRAMUSCULAR; INTRAVENOUS at 12:32

## 2018-07-26 RX ADMIN — ATORVASTATIN CALCIUM 10 MG: 10 TABLET, FILM COATED ORAL at 08:24

## 2018-07-26 RX ADMIN — VANCOMYCIN HYDROCHLORIDE 750 MG: 750 INJECTION, POWDER, LYOPHILIZED, FOR SOLUTION INTRAVENOUS at 08:23

## 2018-07-26 ASSESSMENT — ENCOUNTER SYMPTOMS
VOMITING: 0
BLURRED VISION: 0
ORTHOPNEA: 0
HEARTBURN: 0
SHORTNESS OF BREATH: 1
PHOTOPHOBIA: 0
NAUSEA: 0
BACK PAIN: 0
COUGH: 1
SPUTUM PRODUCTION: 1
DOUBLE VISION: 0
ABDOMINAL PAIN: 0

## 2018-07-26 NOTE — CARE COORDINATION
CASE MANAGEMENT INITIAL ASSESSMENT      Reviewed chart and met with patient today, re: Possible d/c needs   Explained Case Management role/services. Family present: None   Primary contact information: Pt's wife Dawn Townsend [phone]    Admit date/status: 7/25/18   Diagnosis: Respiratory Failure     Insurance: [address] required for SNF - Y        3 night stay required -  N    Living arrangements, Adls, care needs, prior to admission: Pt lives at home with his wife     Transportation: Family     Durable Medical Equipment at home: Walker_X_Cane_X_RTS__ BSC__Shower Chair__  02_X ([facility]) 3LNC_ HHN__ CPAP__  [facility] Bed__ W/C___ Other__________    Services in the home and/or outpatient, prior to admission: [facility]     PT/OT recs: not yet ordered     Hospital Exemption Notification (HEN): N/A     Barriers to discharge: None     Plan/comments: 7/26/18 Pt was active with [facility] PTA and plans to resume services with them on d/c. Has home o2.  Will follow     ECOC on chart for MD signature

## 2018-07-26 NOTE — CONSULTS
Pulmonary & Critical Care Consultation Note   Farnaz Hamm MD    REASON FOR CONSULTATION/CC: ILD, acute resp failure    Consult at request of  Osmin Chiu MD  PCP: Suly Magana    HISTORY OF PRESENT ILLNESS:   80y.o. year old male with interstitial lung disease and emphysema, prior asbestos exposure as the etiology, he follows with [facility] specialist who manage this and is chronically on 4 LPM of oxygen. Has been admitted multiple times in the last 2 months for various issues. He presented from home with increased shortness of breath over the prior day, constant at rest and not relieved with use of his supplemental oxygen, worsened with even mild exertion. Has a chronic daily cough that is nonproductive, however he has had increased congestion and expectorated a small amount of dark red sputum today. Denies any fevers or chills, denies chest pain. Workup in the ED was significant for hypoxia that required high flow oxygen to assist with. He was admitted and placed on treatment for a possible HCAP as well as steroids, pulmonary consulted for assistance. Past Medical History:   Diagnosis Date    Aneurysm ([address]     abdominal aortic    CAD (coronary artery disease)     Cancer (HCC)     lung     Cancer (HCC)     skin    Chronic diastolic CHF (grade 2 LVDD) 7/25/2018    COPD (chronic obstructive pulmonary disease) (HCC)     Heart murmur     Hypercholesteremia     Hypertension     Influenza A 12/7/14       Past Surgical History:   Procedure Laterality Date    ABDOMINAL AORTIC ANEURYSM REPAIR      CHOLECYSTECTOMY, LAPAROSCOPIC N/A 04/03/2018    UPPER GASTROINTESTINAL ENDOSCOPY  06/23/2018    small tear seen in esophagus       family history includes No Known Problems in his father and mother.     Social History   Substance Use Topics    Smoking status: Former Smoker     Quit date: 12/5/1980    Smokeless tobacco: Never Used    Alcohol use No        Scheduled Meds:   ipratropium-albuterol  1

## 2018-07-26 NOTE — CONSULTS
Pharmacy to dose Vanco:  Vanco 1gm given at 2020 to provide Gram+ organism coverage to include MRSA (HCAP). Will continue to dose at 750mg IV Q12hrs and check a Trough at 0730 7/27/18  Celio Encarnacion PharmD 7/25/2018 10:32 PM       7/27 0734  Dx: Sheryle Lazar pneumonia  Goal trough: 15-20 mcg/mL  Vanc trough = 10.6 mcg/mL ~10.5 hr post dose  Estimated Creatinine Clearance: 83 mL/min (A) (based on SCr of 0.6 mg/dL (L)).   Will increase dose to 1000 mg q12h  Recheck trough level on 7/28 @2030 after 3 doses  WBC slightly elevated at 12.3, afebrile, Cx NGTD x2  Marcio Osorio PharmD  7/27/2018 at 8:57 AM

## 2018-07-26 NOTE — PROGRESS NOTES
Urine sample labels printed and placed in patient's room. Educated patient on need for a urine sample with next void. ALEJANDRA Moreno also made aware.

## 2018-07-27 LAB
ALBUMIN SERPL-MCNC: 2.9 G/DL (ref 3.4–5)
ALP BLD-CCNC: 109 U/L (ref 40–129)
ALT SERPL-CCNC: 11 U/L (ref 10–40)
ANION GAP SERPL CALCULATED.3IONS-SCNC: 9 MMOL/L (ref 3–16)
AST SERPL-CCNC: 16 U/L (ref 15–37)
BASOPHILS ABSOLUTE: 0 K/UL (ref 0–0.2)
BASOPHILS RELATIVE PERCENT: 0.1 %
BILIRUB SERPL-MCNC: 0.4 MG/DL (ref 0–1)
BILIRUBIN DIRECT: <0.2 MG/DL (ref 0–0.3)
BILIRUBIN, INDIRECT: ABNORMAL MG/DL (ref 0–1)
BUN BLDV-MCNC: 13 MG/DL (ref 7–20)
CALCIUM SERPL-MCNC: 9 MG/DL (ref 8.3–10.6)
CHLORIDE BLD-SCNC: 106 MMOL/L (ref 99–110)
CO2: 26 MMOL/L (ref 21–32)
CREAT SERPL-MCNC: 0.6 MG/DL (ref 0.8–1.3)
EOSINOPHILS ABSOLUTE: 0 K/UL (ref 0–0.6)
EOSINOPHILS RELATIVE PERCENT: 0 %
GFR AFRICAN AMERICAN: >60
GFR NON-AFRICAN AMERICAN: >60
GLUCOSE BLD-MCNC: 159 MG/DL (ref 70–99)
HCT VFR BLD CALC: 32.2 % (ref 40.5–52.5)
HEMOGLOBIN: 11.2 G/DL (ref 13.5–17.5)
INR BLD: 1.32 (ref 0.86–1.14)
L. PNEUMOPHILA SEROGP 1 UR AG: NORMAL
LYMPHOCYTES ABSOLUTE: 0.2 K/UL (ref 1–5.1)
LYMPHOCYTES RELATIVE PERCENT: 1.8 %
MAGNESIUM: 2 MG/DL (ref 1.8–2.4)
MCH RBC QN AUTO: 30.7 PG (ref 26–34)
MCHC RBC AUTO-ENTMCNC: 34.7 G/DL (ref 31–36)
MCV RBC AUTO: 88.7 FL (ref 80–100)
MONOCYTES ABSOLUTE: 0.4 K/UL (ref 0–1.3)
MONOCYTES RELATIVE PERCENT: 3.5 %
NEUTROPHILS ABSOLUTE: 11.6 K/UL (ref 1.7–7.7)
NEUTROPHILS RELATIVE PERCENT: 94.6 %
PDW BLD-RTO: 14.5 % (ref 12.4–15.4)
PHOSPHORUS: 1.8 MG/DL (ref 2.5–4.9)
PLATELET # BLD: 186 K/UL (ref 135–450)
PMV BLD AUTO: 7.9 FL (ref 5–10.5)
POTASSIUM SERPL-SCNC: 3.5 MMOL/L (ref 3.5–5.1)
PRO-BNP: 4526 PG/ML (ref 0–449)
PROTHROMBIN TIME: 15 SEC (ref 9.8–13)
RBC # BLD: 3.63 M/UL (ref 4.2–5.9)
SODIUM BLD-SCNC: 141 MMOL/L (ref 136–145)
STREP PNEUMONIAE ANTIGEN, URINE: NORMAL
TOTAL PROTEIN: 5.8 G/DL (ref 6.4–8.2)
VANCOMYCIN TROUGH: 10.6 UG/ML (ref 10–20)
WBC # BLD: 12.3 K/UL (ref 4–11)

## 2018-07-27 PROCEDURE — 6360000002 HC RX W HCPCS: Performed by: HOSPITALIST

## 2018-07-27 PROCEDURE — 83880 ASSAY OF NATRIURETIC PEPTIDE: CPT

## 2018-07-27 PROCEDURE — 6370000000 HC RX 637 (ALT 250 FOR IP): Performed by: HOSPITALIST

## 2018-07-27 PROCEDURE — 80048 BASIC METABOLIC PNL TOTAL CA: CPT

## 2018-07-27 PROCEDURE — 99233 SBSQ HOSP IP/OBS HIGH 50: CPT | Performed by: INTERNAL MEDICINE

## 2018-07-27 PROCEDURE — 6360000002 HC RX W HCPCS: Performed by: INTERNAL MEDICINE

## 2018-07-27 PROCEDURE — 94664 DEMO&/EVAL PT USE INHALER: CPT

## 2018-07-27 PROCEDURE — 83735 ASSAY OF MAGNESIUM: CPT

## 2018-07-27 PROCEDURE — 2060000000 HC ICU INTERMEDIATE R&B

## 2018-07-27 PROCEDURE — 80076 HEPATIC FUNCTION PANEL: CPT

## 2018-07-27 PROCEDURE — 94640 AIRWAY INHALATION TREATMENT: CPT

## 2018-07-27 PROCEDURE — 94761 N-INVAS EAR/PLS OXIMETRY MLT: CPT

## 2018-07-27 PROCEDURE — 80202 ASSAY OF VANCOMYCIN: CPT

## 2018-07-27 PROCEDURE — 85610 PROTHROMBIN TIME: CPT

## 2018-07-27 PROCEDURE — 85025 COMPLETE CBC W/AUTO DIFF WBC: CPT

## 2018-07-27 PROCEDURE — 84100 ASSAY OF PHOSPHORUS: CPT

## 2018-07-27 PROCEDURE — 2580000003 HC RX 258: Performed by: HOSPITALIST

## 2018-07-27 PROCEDURE — 36415 COLL VENOUS BLD VENIPUNCTURE: CPT

## 2018-07-27 PROCEDURE — 2700000000 HC OXYGEN THERAPY PER DAY

## 2018-07-27 RX ORDER — FUROSEMIDE 10 MG/ML
20 INJECTION INTRAMUSCULAR; INTRAVENOUS DAILY
Status: DISCONTINUED | OUTPATIENT
Start: 2018-07-28 | End: 2018-07-31 | Stop reason: HOSPADM

## 2018-07-27 RX ORDER — FUROSEMIDE 10 MG/ML
20 INJECTION INTRAMUSCULAR; INTRAVENOUS ONCE
Status: COMPLETED | OUTPATIENT
Start: 2018-07-27 | End: 2018-07-27

## 2018-07-27 RX ADMIN — PIPERACILLIN AND TAZOBACTAM 3.38 G: 3; .375 INJECTION, POWDER, FOR SOLUTION INTRAVENOUS at 17:41

## 2018-07-27 RX ADMIN — ATORVASTATIN CALCIUM 10 MG: 10 TABLET, FILM COATED ORAL at 09:27

## 2018-07-27 RX ADMIN — IPRATROPIUM BROMIDE AND ALBUTEROL SULFATE 1 AMPULE: .5; 3 SOLUTION RESPIRATORY (INHALATION) at 09:10

## 2018-07-27 RX ADMIN — ENOXAPARIN SODIUM 40 MG: 100 INJECTION SUBCUTANEOUS at 09:27

## 2018-07-27 RX ADMIN — METHYLPREDNISOLONE SODIUM SUCCINATE 60 MG: 125 INJECTION, POWDER, FOR SOLUTION INTRAMUSCULAR; INTRAVENOUS at 09:27

## 2018-07-27 RX ADMIN — Medication 10 ML: at 21:33

## 2018-07-27 RX ADMIN — METHYLPREDNISOLONE SODIUM SUCCINATE 60 MG: 125 INJECTION, POWDER, FOR SOLUTION INTRAMUSCULAR; INTRAVENOUS at 15:24

## 2018-07-27 RX ADMIN — METHYLPREDNISOLONE SODIUM SUCCINATE 60 MG: 125 INJECTION, POWDER, FOR SOLUTION INTRAMUSCULAR; INTRAVENOUS at 21:33

## 2018-07-27 RX ADMIN — DOCUSATE SODIUM 100 MG: 100 CAPSULE, LIQUID FILLED ORAL at 09:27

## 2018-07-27 RX ADMIN — PANTOPRAZOLE SODIUM 40 MG: 40 TABLET, DELAYED RELEASE ORAL at 15:24

## 2018-07-27 RX ADMIN — ASPIRIN 325 MG: 325 TABLET ORAL at 09:27

## 2018-07-27 RX ADMIN — PANTOPRAZOLE SODIUM 40 MG: 40 TABLET, DELAYED RELEASE ORAL at 05:23

## 2018-07-27 RX ADMIN — PIPERACILLIN AND TAZOBACTAM 3.38 G: 3; .375 INJECTION, POWDER, FOR SOLUTION INTRAVENOUS at 02:37

## 2018-07-27 RX ADMIN — VANCOMYCIN HYDROCHLORIDE 1000 MG: 1 INJECTION, POWDER, LYOPHILIZED, FOR SOLUTION INTRAVENOUS at 21:33

## 2018-07-27 RX ADMIN — IPRATROPIUM BROMIDE AND ALBUTEROL SULFATE 1 AMPULE: .5; 3 SOLUTION RESPIRATORY (INHALATION) at 00:29

## 2018-07-27 RX ADMIN — IPRATROPIUM BROMIDE AND ALBUTEROL SULFATE 1 AMPULE: .5; 3 SOLUTION RESPIRATORY (INHALATION) at 16:05

## 2018-07-27 RX ADMIN — SODIUM CHLORIDE: 9 INJECTION, SOLUTION INTRAVENOUS at 02:41

## 2018-07-27 RX ADMIN — IPRATROPIUM BROMIDE AND ALBUTEROL SULFATE 1 AMPULE: .5; 3 SOLUTION RESPIRATORY (INHALATION) at 20:25

## 2018-07-27 RX ADMIN — Medication 1 TABLET: at 09:27

## 2018-07-27 RX ADMIN — PIPERACILLIN AND TAZOBACTAM 3.38 G: 3; .375 INJECTION, POWDER, FOR SOLUTION INTRAVENOUS at 11:07

## 2018-07-27 RX ADMIN — METHYLPREDNISOLONE SODIUM SUCCINATE 60 MG: 125 INJECTION, POWDER, FOR SOLUTION INTRAMUSCULAR; INTRAVENOUS at 02:37

## 2018-07-27 RX ADMIN — FUROSEMIDE 20 MG: 10 INJECTION, SOLUTION INTRAVENOUS at 11:07

## 2018-07-27 RX ADMIN — TAMSULOSIN HYDROCHLORIDE 0.4 MG: 0.4 CAPSULE ORAL at 09:27

## 2018-07-27 RX ADMIN — VANCOMYCIN HYDROCHLORIDE 1000 MG: 1 INJECTION, POWDER, LYOPHILIZED, FOR SOLUTION INTRAVENOUS at 09:27

## 2018-07-27 RX ADMIN — DOCUSATE SODIUM 100 MG: 100 CAPSULE, LIQUID FILLED ORAL at 21:34

## 2018-07-27 RX ADMIN — TIOTROPIUM BROMIDE 18 MCG: 18 CAPSULE ORAL; RESPIRATORY (INHALATION) at 09:10

## 2018-07-27 RX ADMIN — IPRATROPIUM BROMIDE AND ALBUTEROL SULFATE 1 AMPULE: .5; 3 SOLUTION RESPIRATORY (INHALATION) at 12:39

## 2018-07-27 NOTE — PROGRESS NOTES
Hospitalist Progress Note      PCP: Chen Leos    Date of Admission: 7/25/2018    Chief Complaint: shortness of breath      Hospital Course: feels slightly better. Still requires high rates of oxygen. Responded well to IV Lasix     Subjective: shortness of breath, no chest pain, no nausea or vomiting. Has shortness of breath at rest, but mild       Medications:  Reviewed    Infusion Medications   Scheduled Medications    vancomycin  1,000 mg Intravenous Q12H    [START ON 7/28/2018] furosemide  20 mg Intravenous Daily    ipratropium-albuterol  1 ampule Inhalation Q4H    methylPREDNISolone  60 mg Intravenous Q6H    aspirin  325 mg Oral Daily    atorvastatin  10 mg Oral Daily    docusate sodium  100 mg Oral BID    pantoprazole  40 mg Oral BID AC    tamsulosin  0.4 mg Oral Daily    therapeutic multivitamin-minerals  1 tablet Oral Daily    tiotropium  18 mcg Inhalation Daily    piperacillin-tazobactam  3.375 g Intravenous Q8H    sodium chloride flush  10 mL Intravenous 2 times per day    enoxaparin  40 mg Subcutaneous Daily     PRN Meds: albuterol sulfate HFA, benzonatate, cyclobenzaprine, oxyCODONE-acetaminophen, sodium chloride flush, magnesium hydroxide, ondansetron, potassium chloride **OR** potassium chloride **OR** potassium chloride, magnesium sulfate, acetaminophen      Intake/Output Summary (Last 24 hours) at 07/27/18 1911  Last data filed at 07/27/18 1415   Gross per 24 hour   Intake              840 ml   Output             1508 ml   Net             -668 ml       Physical Exam Performed:    /61   Pulse 88   Temp 98 °F (36.7 °C) (Oral)   Resp 20   Ht 5' 8\" (1.727 m)   Wt 138 lb 3.7 oz (62.7 kg)   SpO2 94%   BMI 21.02 kg/m²     General appearance: No apparent distress, appears stated age and cooperative. HEENT: Pupils equal, round, and reactive to light. Conjunctivae/corneas clear. Neck: Supple, with full range of motion. No jugular venous distention.  Trachea midline. Respiratory:  Normal respiratory effort. Diffuse crackles  Cardiovascular: Regular rate and rhythm with normal S1/S2 without murmurs, rubs or gallops. Abdomen: Soft, non-tender, non-distended with normal bowel sounds. Musculoskeletal: Has clubbing, no cyanosis or edema bilaterally. Full range of motion without deformity. Skin: Skin color, texture, turgor normal.  No rashes or lesions. Neurologic:  Neurovascularly intact without any focal sensory/motor deficits. Cranial nerves: II-XII intact, grossly non-focal.  Psychiatric: Alert and oriented, thought content appropriate, normal insight  Capillary Refill: Brisk,< 3 seconds   Peripheral Pulses: +2 palpable, equal bilaterally       Labs:   Recent Labs      07/25/18   1416  07/26/18   0525  07/27/18   0445   WBC  11.6*  7.7  12.3*   HGB  13.7  12.2*  11.2*   HCT  40.6  35.7*  32.2*   PLT  177  176  186     Recent Labs      07/25/18   1416 07/26/18   0525  07/27/18   0445   NA  131*  137  141   K  4.9  4.2  3.5   CL  94*  98*  106   CO2  28  28  26   BUN  19  15  13   CREATININE  0.8  0.8  0.6*   CALCIUM  9.0  9.3  9.0   PHOS   --   2.9  1.8*     Recent Labs      07/25/18   1416  07/26/18   0525  07/27/18   0445   AST  34  18  16   ALT  15  10  11   BILIDIR   --   0.3  <0.2   BILITOT  0.8  0.7  0.4   ALKPHOS  142*  124  109     Recent Labs      07/25/18   1416  07/26/18   0526  07/27/18   0445   INR  1.30*  1.46*  1.32*     Recent Labs      07/25/18   1416  07/25/18   2338  07/26/18   0525   TROPONINI  <0.01  <0.01  <0.01       Urinalysis:    Lab Results   Component Value Date    NITRU Negative 07/26/2018    WBCUA 0-2 07/26/2018    BACTERIA 1+ 07/26/2018    RBCUA None seen 07/26/2018    BLOODU Negative 07/26/2018    SPECGRAV <=1.005 07/26/2018    GLUCOSEU Negative 07/26/2018       Radiology:  CTA PULMONARY W CONTRAST   Final Result   No evidence of pulmonary embolism or aortic dissection.       Continued evidence of interstitial fibrosis, within apical

## 2018-07-27 NOTE — PROGRESS NOTES
Patient SpO2 desated while getting cleaned up on the side of the bed. Patient's oxygen was increased to 8L high flow. Will allow patient to recover and will attempt to titrate down as SpO2 allows.

## 2018-07-27 NOTE — PROGRESS NOTES
Pulmonary & Critical Care Inpatient Progress Note   Elizabeth Sharma MD     REASON FOR TODAY'S VISIT:  Acute resp failure    SUBJECTIVE:   Now on 6 LPM of oxygen via high flow, required escalation to 8 over night  Still with blood tinged sputum    Scheduled Meds:   vancomycin  1,000 mg Intravenous Q12H    ipratropium-albuterol  1 ampule Inhalation Q4H    methylPREDNISolone  60 mg Intravenous Q6H    aspirin  325 mg Oral Daily    atorvastatin  10 mg Oral Daily    docusate sodium  100 mg Oral BID    pantoprazole  40 mg Oral BID AC    tamsulosin  0.4 mg Oral Daily    therapeutic multivitamin-minerals  1 tablet Oral Daily    tiotropium  18 mcg Inhalation Daily    piperacillin-tazobactam  3.375 g Intravenous Q8H    sodium chloride flush  10 mL Intravenous 2 times per day    enoxaparin  40 mg Subcutaneous Daily       Continuous Infusions:      PRN Meds:  albuterol sulfate HFA, benzonatate, cyclobenzaprine, oxyCODONE-acetaminophen, sodium chloride flush, magnesium hydroxide, ondansetron, potassium chloride **OR** potassium chloride **OR** potassium chloride, magnesium sulfate, acetaminophen    ALLERGIES:  Patient has No Known Allergies. Objective:   PHYSICAL EXAM:  /71   Pulse 97   Temp 97.9 °F (36.6 °C) (Oral)   Resp 16   Ht 5' 8\" (1.727 m)   Wt 138 lb 3.7 oz (62.7 kg)   SpO2 95%   BMI 21.02 kg/m²    Physical Exam   Constitutional: He appears well-developed and well-nourished. No distress. HENT:   Head: Normocephalic and atraumatic. Mouth/Throat: Oropharynx is clear and moist. No oropharyngeal exudate. Eyes: EOM are normal. Pupils are equal, round, and reactive to light. Neck: Neck supple. No JVD present. Cardiovascular: Normal heart sounds. Exam reveals no gallop and no friction rub. No murmur heard. Pulmonary/Chest: Effort normal. He has no wheezes. He has rales. Equal chest rise and expansion bilaterally   Abdominal: Soft. Bowel sounds are normal. He exhibits no distension.  There is no tenderness. Musculoskeletal: Normal range of motion. He exhibits no edema. Lymphadenopathy:     He has no cervical adenopathy. Neurological: He is alert. No cranial nerve deficit. CN 2-12 grossly intact   Skin: Skin is warm and dry. No rash noted. He is not diaphoretic. Data Reviewed:   LABS:  CBC:   Recent Labs      07/25/18   1416  07/26/18   0525  07/27/18   0445   WBC  11.6*  7.7  12.3*   HGB  13.7  12.2*  11.2*   HCT  40.6  35.7*  32.2*   MCV  90.6  89.8  88.7   PLT  177  176  186     BMP:   Recent Labs      07/25/18   1416  07/26/18   0525  07/27/18   0445   NA  131*  137  141   K  4.9  4.2  3.5   CL  94*  98*  106   CO2  28  28  26   PHOS   --   2.9  1.8*   BUN  19  15  13   CREATININE  0.8  0.8  0.6*     LIVER PROFILE:   Recent Labs      07/25/18   1416  07/26/18   0525  07/27/18   0445   AST  34  18  16   ALT  15  10  11   BILIDIR   --   0.3  <0.2   BILITOT  0.8  0.7  0.4   ALKPHOS  142*  124  109     PT/INR:   Recent Labs      07/25/18   1416  07/26/18   0526  07/27/18   0445   PROTIME  14.8*  16.6*  15.0*   INR  1.30*  1.46*  1.32*     APTT: No results for input(s): APTT in the last 72 hours. UA:  Recent Labs      07/26/18   1244   COLORU  Yellow   PHUR  6.0   WBCUA  0-2   RBCUA  None seen   BACTERIA  1+*   CLARITYU  Clear   SPECGRAV  <=1.005   LEUKOCYTESUR  Negative   UROBILINOGEN  0.2   BILIRUBINUR  Negative   BLOODU  Negative   GLUCOSEU  Negative     No results for input(s): PHART, CBG9BVO, PO2ART in the last 72 hours. CT chest personally reviewed, severe pulmonary fibrosis and pleural thickening. Assessment:     1. Acute on chronic resp failure, mainly hypoxic  2. Interstitial lung disease and COPD with acute exacerbation               -underlying asbestos exposure etiology  3. Acute on chronic diastolic CHF    Plan:      -Continue steroids  -Agree with diuresis.  Bloody sputum likely related to this, will consider bronch in the next 1-2 days if not improving but hold off for now as he is high risk due to ILD  -Bronchodilators  -Empiric atb, will de-escalate to levaquin or doxy in the next 1-2 days once more improved.  Plan for a 7 day course given elevated procalcitonin and recurrent admissions.   -Repeat CXR in Daphnie Kemp MD

## 2018-07-28 ENCOUNTER — APPOINTMENT (OUTPATIENT)
Dept: GENERAL RADIOLOGY | Age: 83
DRG: 871 | End: 2018-07-28
Payer: MEDICARE

## 2018-07-28 LAB
ALBUMIN SERPL-MCNC: 2.9 G/DL (ref 3.4–5)
ALP BLD-CCNC: 112 U/L (ref 40–129)
ALT SERPL-CCNC: 15 U/L (ref 10–40)
ANION GAP SERPL CALCULATED.3IONS-SCNC: 11 MMOL/L (ref 3–16)
AST SERPL-CCNC: 18 U/L (ref 15–37)
BASOPHILS ABSOLUTE: 0 K/UL (ref 0–0.2)
BASOPHILS RELATIVE PERCENT: 0 %
BILIRUB SERPL-MCNC: 0.5 MG/DL (ref 0–1)
BILIRUBIN DIRECT: <0.2 MG/DL (ref 0–0.3)
BILIRUBIN, INDIRECT: ABNORMAL MG/DL (ref 0–1)
BUN BLDV-MCNC: 15 MG/DL (ref 7–20)
CALCIUM SERPL-MCNC: 8.7 MG/DL (ref 8.3–10.6)
CHLORIDE BLD-SCNC: 104 MMOL/L (ref 99–110)
CO2: 27 MMOL/L (ref 21–32)
CREAT SERPL-MCNC: 0.6 MG/DL (ref 0.8–1.3)
EOSINOPHILS ABSOLUTE: 0 K/UL (ref 0–0.6)
EOSINOPHILS RELATIVE PERCENT: 0 %
GFR AFRICAN AMERICAN: >60
GFR NON-AFRICAN AMERICAN: >60
GLUCOSE BLD-MCNC: 155 MG/DL (ref 70–99)
HCT VFR BLD CALC: 34.3 % (ref 40.5–52.5)
HEMOGLOBIN: 11.7 G/DL (ref 13.5–17.5)
INR BLD: 1.31 (ref 0.86–1.14)
LYMPHOCYTES ABSOLUTE: 0.2 K/UL (ref 1–5.1)
LYMPHOCYTES RELATIVE PERCENT: 1.3 %
MAGNESIUM: 1.9 MG/DL (ref 1.8–2.4)
MCH RBC QN AUTO: 30.6 PG (ref 26–34)
MCHC RBC AUTO-ENTMCNC: 34.1 G/DL (ref 31–36)
MCV RBC AUTO: 89.6 FL (ref 80–100)
MONOCYTES ABSOLUTE: 0.2 K/UL (ref 0–1.3)
MONOCYTES RELATIVE PERCENT: 1.4 %
MRSA SCREEN RT-PCR: NORMAL
NEUTROPHILS ABSOLUTE: 14 K/UL (ref 1.7–7.7)
NEUTROPHILS RELATIVE PERCENT: 97.3 %
PDW BLD-RTO: 14.8 % (ref 12.4–15.4)
PHOSPHORUS: 2.6 MG/DL (ref 2.5–4.9)
PLATELET # BLD: 226 K/UL (ref 135–450)
PMV BLD AUTO: 7.7 FL (ref 5–10.5)
POTASSIUM SERPL-SCNC: 3.6 MMOL/L (ref 3.5–5.1)
PRO-BNP: 4080 PG/ML (ref 0–449)
PROTHROMBIN TIME: 14.9 SEC (ref 9.8–13)
RBC # BLD: 3.83 M/UL (ref 4.2–5.9)
SODIUM BLD-SCNC: 142 MMOL/L (ref 136–145)
TOTAL PROTEIN: 5.8 G/DL (ref 6.4–8.2)
WBC # BLD: 14.4 K/UL (ref 4–11)

## 2018-07-28 PROCEDURE — 80048 BASIC METABOLIC PNL TOTAL CA: CPT

## 2018-07-28 PROCEDURE — 87486 CHLMYD PNEUM DNA AMP PROBE: CPT

## 2018-07-28 PROCEDURE — 71045 X-RAY EXAM CHEST 1 VIEW: CPT

## 2018-07-28 PROCEDURE — 94664 DEMO&/EVAL PT USE INHALER: CPT

## 2018-07-28 PROCEDURE — 94761 N-INVAS EAR/PLS OXIMETRY MLT: CPT

## 2018-07-28 PROCEDURE — 83735 ASSAY OF MAGNESIUM: CPT

## 2018-07-28 PROCEDURE — 6370000000 HC RX 637 (ALT 250 FOR IP): Performed by: HOSPITALIST

## 2018-07-28 PROCEDURE — 2700000000 HC OXYGEN THERAPY PER DAY

## 2018-07-28 PROCEDURE — 2060000000 HC ICU INTERMEDIATE R&B

## 2018-07-28 PROCEDURE — 6360000002 HC RX W HCPCS: Performed by: INTERNAL MEDICINE

## 2018-07-28 PROCEDURE — 83880 ASSAY OF NATRIURETIC PEPTIDE: CPT

## 2018-07-28 PROCEDURE — 85610 PROTHROMBIN TIME: CPT

## 2018-07-28 PROCEDURE — 36415 COLL VENOUS BLD VENIPUNCTURE: CPT

## 2018-07-28 PROCEDURE — 87798 DETECT AGENT NOS DNA AMP: CPT

## 2018-07-28 PROCEDURE — 85025 COMPLETE CBC W/AUTO DIFF WBC: CPT

## 2018-07-28 PROCEDURE — 94640 AIRWAY INHALATION TREATMENT: CPT

## 2018-07-28 PROCEDURE — 87581 M.PNEUMON DNA AMP PROBE: CPT

## 2018-07-28 PROCEDURE — 99233 SBSQ HOSP IP/OBS HIGH 50: CPT | Performed by: INTERNAL MEDICINE

## 2018-07-28 PROCEDURE — 87633 RESP VIRUS 12-25 TARGETS: CPT

## 2018-07-28 PROCEDURE — 6360000002 HC RX W HCPCS: Performed by: HOSPITALIST

## 2018-07-28 PROCEDURE — 84100 ASSAY OF PHOSPHORUS: CPT

## 2018-07-28 PROCEDURE — 2580000003 HC RX 258: Performed by: HOSPITALIST

## 2018-07-28 PROCEDURE — 80076 HEPATIC FUNCTION PANEL: CPT

## 2018-07-28 RX ORDER — METHYLPREDNISOLONE SODIUM SUCCINATE 125 MG/2ML
60 INJECTION, POWDER, LYOPHILIZED, FOR SOLUTION INTRAMUSCULAR; INTRAVENOUS EVERY 12 HOURS
Status: DISCONTINUED | OUTPATIENT
Start: 2018-07-28 | End: 2018-07-29

## 2018-07-28 RX ADMIN — ASPIRIN 325 MG: 325 TABLET ORAL at 09:40

## 2018-07-28 RX ADMIN — PIPERACILLIN AND TAZOBACTAM 3.38 G: 3; .375 INJECTION, POWDER, FOR SOLUTION INTRAVENOUS at 10:03

## 2018-07-28 RX ADMIN — TAMSULOSIN HYDROCHLORIDE 0.4 MG: 0.4 CAPSULE ORAL at 09:40

## 2018-07-28 RX ADMIN — Medication 10 ML: at 09:41

## 2018-07-28 RX ADMIN — IPRATROPIUM BROMIDE AND ALBUTEROL SULFATE 1 AMPULE: .5; 3 SOLUTION RESPIRATORY (INHALATION) at 12:01

## 2018-07-28 RX ADMIN — IPRATROPIUM BROMIDE AND ALBUTEROL SULFATE 1 AMPULE: .5; 3 SOLUTION RESPIRATORY (INHALATION) at 20:21

## 2018-07-28 RX ADMIN — ATORVASTATIN CALCIUM 10 MG: 10 TABLET, FILM COATED ORAL at 09:40

## 2018-07-28 RX ADMIN — IPRATROPIUM BROMIDE AND ALBUTEROL SULFATE 1 AMPULE: .5; 3 SOLUTION RESPIRATORY (INHALATION) at 16:12

## 2018-07-28 RX ADMIN — METHYLPREDNISOLONE SODIUM SUCCINATE 60 MG: 125 INJECTION, POWDER, FOR SOLUTION INTRAMUSCULAR; INTRAVENOUS at 22:03

## 2018-07-28 RX ADMIN — ENOXAPARIN SODIUM 40 MG: 100 INJECTION SUBCUTANEOUS at 09:41

## 2018-07-28 RX ADMIN — METHYLPREDNISOLONE SODIUM SUCCINATE 60 MG: 125 INJECTION, POWDER, FOR SOLUTION INTRAMUSCULAR; INTRAVENOUS at 03:22

## 2018-07-28 RX ADMIN — DOCUSATE SODIUM 100 MG: 100 CAPSULE, LIQUID FILLED ORAL at 22:03

## 2018-07-28 RX ADMIN — VANCOMYCIN HYDROCHLORIDE 1000 MG: 1 INJECTION, POWDER, LYOPHILIZED, FOR SOLUTION INTRAVENOUS at 09:40

## 2018-07-28 RX ADMIN — IPRATROPIUM BROMIDE AND ALBUTEROL SULFATE 1 AMPULE: .5; 3 SOLUTION RESPIRATORY (INHALATION) at 08:12

## 2018-07-28 RX ADMIN — METHYLPREDNISOLONE SODIUM SUCCINATE 60 MG: 125 INJECTION, POWDER, FOR SOLUTION INTRAMUSCULAR; INTRAVENOUS at 09:40

## 2018-07-28 RX ADMIN — PIPERACILLIN AND TAZOBACTAM 3.38 G: 3; .375 INJECTION, POWDER, FOR SOLUTION INTRAVENOUS at 03:23

## 2018-07-28 RX ADMIN — IPRATROPIUM BROMIDE AND ALBUTEROL SULFATE 1 AMPULE: .5; 3 SOLUTION RESPIRATORY (INHALATION) at 00:06

## 2018-07-28 RX ADMIN — DOCUSATE SODIUM 100 MG: 100 CAPSULE, LIQUID FILLED ORAL at 09:40

## 2018-07-28 RX ADMIN — IPRATROPIUM BROMIDE AND ALBUTEROL SULFATE 1 AMPULE: .5; 3 SOLUTION RESPIRATORY (INHALATION) at 04:19

## 2018-07-28 RX ADMIN — FUROSEMIDE 20 MG: 10 INJECTION, SOLUTION INTRAMUSCULAR; INTRAVENOUS at 09:40

## 2018-07-28 RX ADMIN — Medication 10 ML: at 22:03

## 2018-07-28 RX ADMIN — PANTOPRAZOLE SODIUM 40 MG: 40 TABLET, DELAYED RELEASE ORAL at 16:30

## 2018-07-28 RX ADMIN — Medication 1 TABLET: at 09:40

## 2018-07-28 RX ADMIN — PANTOPRAZOLE SODIUM 40 MG: 40 TABLET, DELAYED RELEASE ORAL at 06:48

## 2018-07-28 RX ADMIN — PIPERACILLIN AND TAZOBACTAM 3.38 G: 3; .375 INJECTION, POWDER, FOR SOLUTION INTRAVENOUS at 18:53

## 2018-07-28 RX ADMIN — TIOTROPIUM BROMIDE 18 MCG: 18 CAPSULE ORAL; RESPIRATORY (INHALATION) at 08:12

## 2018-07-28 NOTE — PROGRESS NOTES
Hospitalist Progress Note      PCP: Arabella Lemus    Date of Admission: 7/25/2018    Chief Complaint: shortness of breath      Hospital Course: feels slightly better. Oxygen need is slightly low with diuresis. Still becomes hypoxemic with exertion    Subjective: Has shortness of breath, no chest pain, no nausea or vomiting. Others reviewed in complete detail and found negative      Medications:  Reviewed    Infusion Medications   Scheduled Medications    methylPREDNISolone  60 mg Intravenous Q12H    furosemide  20 mg Intravenous Daily    ipratropium-albuterol  1 ampule Inhalation Q4H    aspirin  325 mg Oral Daily    atorvastatin  10 mg Oral Daily    docusate sodium  100 mg Oral BID    pantoprazole  40 mg Oral BID AC    tamsulosin  0.4 mg Oral Daily    therapeutic multivitamin-minerals  1 tablet Oral Daily    tiotropium  18 mcg Inhalation Daily    piperacillin-tazobactam  3.375 g Intravenous Q8H    sodium chloride flush  10 mL Intravenous 2 times per day    enoxaparin  40 mg Subcutaneous Daily     PRN Meds: albuterol sulfate HFA, benzonatate, cyclobenzaprine, oxyCODONE-acetaminophen, sodium chloride flush, magnesium hydroxide, ondansetron, potassium chloride **OR** potassium chloride **OR** potassium chloride, magnesium sulfate, acetaminophen      Intake/Output Summary (Last 24 hours) at 07/28/18 1716  Last data filed at 07/28/18 1631   Gross per 24 hour   Intake             1250 ml   Output             2015 ml   Net             -765 ml       Physical Exam Performed:    /70   Pulse 99   Temp 98.6 °F (37 °C)   Resp 18   Ht 5' 8\" (1.727 m)   Wt 137 lb 12.6 oz (62.5 kg)   SpO2 96%   BMI 20.95 kg/m²     General appearance: No apparent distress, appears stated age and cooperative. HEENT: Pupils equal, round, and reactive to light. Conjunctivae/corneas clear. Neck: Supple, with full range of motion. No jugular venous distention. Trachea midline. Respiratory:  Normal respiratory effort. Scattered posterior crackles  Cardiovascular: Regular rate and rhythm with normal S1/S2 without murmurs, rubs or gallops. Abdomen: Soft, non-tender, non-distended with normal bowel sounds. Musculoskeletal: Has clubbing, no cyanosis or edema bilaterally. Full range of motion without deformity. Skin: Skin color, texture, turgor normal.  No rashes or lesions. Neurologic:  Neurovascularly intact without any focal sensory/motor deficits. Cranial nerves: II-XII intact, grossly non-focal.  Psychiatric: Alert and oriented, thought content appropriate, normal insight  Capillary Refill: Brisk,< 3 seconds   Peripheral Pulses: +2 palpable, equal bilaterally       Labs:   Recent Labs      07/26/18   0525  07/27/18   0445  07/28/18   0551   WBC  7.7  12.3*  14.4*   HGB  12.2*  11.2*  11.7*   HCT  35.7*  32.2*  34.3*   PLT  176  186  226     Recent Labs      07/26/18   0525  07/27/18   0445  07/28/18   0551   NA  137  141  142   K  4.2  3.5  3.6   CL  98*  106  104   CO2  28 26 27   BUN  15  13  15   CREATININE  0.8  0.6*  0.6*   CALCIUM  9.3  9.0  8.7   PHOS  2.9  1.8*  2.6     Recent Labs      07/26/18   0525  07/27/18   0445  07/28/18   0551   AST  18  16  18   ALT  10  11  15   BILIDIR  0.3  <0.2  <0.2   BILITOT  0.7  0.4  0.5   ALKPHOS  124  109  112     Recent Labs      07/26/18   0526  07/27/18   0445  07/28/18   0551   INR  1.46*  1.32*  1.31*     Recent Labs      07/25/18   2338  07/26/18   0525   TROPONINI  <0.01  <0.01       Urinalysis:      Lab Results   Component Value Date    NITRU Negative 07/26/2018    WBCUA 0-2 07/26/2018    BACTERIA 1+ 07/26/2018    RBCUA None seen 07/26/2018    BLOODU Negative 07/26/2018    SPECGRAV <=1.005 07/26/2018    GLUCOSEU Negative 07/26/2018       Radiology:  XR CHEST PORTABLE   Final Result   Advanced asbestosis. CTA PULMONARY W CONTRAST   Final Result   No evidence of pulmonary embolism or aortic dissection.       Continued evidence of interstitial fibrosis, within apical

## 2018-07-28 NOTE — PLAN OF CARE
Problem: OXYGENATION/RESPIRATORY FUNCTION  Goal: Patient will achieve/maintain normal respiratory rate/effort  Respiratory rate and effort will be within normal limits for the patient     Intervention: ASSESS OXYGENATION AS ORDERED  Patient's EF (Ejection Fraction) is greater than 40%    Patient has a past medical history of Aneurysm ([facility] [address] CAD (coronary artery disease); Cancer ([facility] [address] Cancer ([address] Chronic diastolic CHF (grade 2 LVDD); COPD (chronic obstructive pulmonary disease) ([address] Heart murmur; Hypercholesteremia; Hypertension; and Influenza A. Comorbidities reviewed and education provided. Patient and family's stated goal of care: better understand heart failure and disease management prior to discharge    Patient's current functional capacity:  Slight limitation of physical activity. Comfortable at rest. Ordinary physical activity results in fatigue, palpitation, dyspnea. Pt resting in bed at this time on 7 l hf. Pt denies shortness of breath. Pt with pitting lower extremity edema. Patient's weights and intake/output reviewed:    Patient Vitals for the past 96 hrs (Last 3 readings):   Weight   07/28/18 0444 137 lb 12.6 oz (62.5 kg)   07/27/18 0550 138 lb 3.7 oz (62.7 kg)   07/26/18 0547 140 lb 10.5 oz (63.8 kg)       Intake/Output Summary (Last 24 hours) at 07/28/18 1609  Last data filed at 07/28/18 1530   Gross per 24 hour   Intake             1250 ml   Output             1615 ml   Net             -365 ml       Patient provided with education on CHF signs/symptoms, causes, discharge medications, daily weights, low sodium diet, activity, and follow-up. Notified patient to call the doctor post discharge if patient experiences shortness of breath, chest pain, swelling, cough, or weight gain of three pounds in a day/five pounds in a week. Also notified patient to call the doctor with dizziness, increased fatigue, decreased or difficulty urinating. Pt demonstrates understanding.  No additional questions at this time.     Education Time: 10 Minutes

## 2018-07-29 LAB
ALBUMIN SERPL-MCNC: 3 G/DL (ref 3.4–5)
ALP BLD-CCNC: 109 U/L (ref 40–129)
ALT SERPL-CCNC: 16 U/L (ref 10–40)
ANION GAP SERPL CALCULATED.3IONS-SCNC: 10 MMOL/L (ref 3–16)
AST SERPL-CCNC: 17 U/L (ref 15–37)
BASOPHILS ABSOLUTE: 0 K/UL (ref 0–0.2)
BASOPHILS RELATIVE PERCENT: 0.3 %
BILIRUB SERPL-MCNC: 0.5 MG/DL (ref 0–1)
BILIRUBIN DIRECT: <0.2 MG/DL (ref 0–0.3)
BILIRUBIN, INDIRECT: ABNORMAL MG/DL (ref 0–1)
BUN BLDV-MCNC: 17 MG/DL (ref 7–20)
CALCIUM SERPL-MCNC: 8.7 MG/DL (ref 8.3–10.6)
CHLORIDE BLD-SCNC: 101 MMOL/L (ref 99–110)
CO2: 30 MMOL/L (ref 21–32)
CREAT SERPL-MCNC: 0.7 MG/DL (ref 0.8–1.3)
CULTURE, RESPIRATORY: ABNORMAL
CULTURE, RESPIRATORY: ABNORMAL
EOSINOPHILS ABSOLUTE: 0 K/UL (ref 0–0.6)
EOSINOPHILS RELATIVE PERCENT: 0 %
GFR AFRICAN AMERICAN: >60
GFR NON-AFRICAN AMERICAN: >60
GLUCOSE BLD-MCNC: 132 MG/DL (ref 70–99)
GRAM STAIN RESULT: ABNORMAL
HCT VFR BLD CALC: 34 % (ref 40.5–52.5)
HEMOGLOBIN: 11.6 G/DL (ref 13.5–17.5)
INR BLD: 1.27 (ref 0.86–1.14)
LYMPHOCYTES ABSOLUTE: 0.3 K/UL (ref 1–5.1)
LYMPHOCYTES RELATIVE PERCENT: 2.4 %
MAGNESIUM: 1.8 MG/DL (ref 1.8–2.4)
MCH RBC QN AUTO: 30.4 PG (ref 26–34)
MCHC RBC AUTO-ENTMCNC: 34.1 G/DL (ref 31–36)
MCV RBC AUTO: 89 FL (ref 80–100)
MONOCYTES ABSOLUTE: 0.4 K/UL (ref 0–1.3)
MONOCYTES RELATIVE PERCENT: 3.1 %
NEUTROPHILS ABSOLUTE: 10.6 K/UL (ref 1.7–7.7)
NEUTROPHILS RELATIVE PERCENT: 94.2 %
ORGANISM: ABNORMAL
PDW BLD-RTO: 14.6 % (ref 12.4–15.4)
PHOSPHORUS: 2.9 MG/DL (ref 2.5–4.9)
PLATELET # BLD: 234 K/UL (ref 135–450)
PMV BLD AUTO: 7.3 FL (ref 5–10.5)
POTASSIUM SERPL-SCNC: 3.6 MMOL/L (ref 3.5–5.1)
PRO-BNP: 2658 PG/ML (ref 0–449)
PROTHROMBIN TIME: 14.5 SEC (ref 9.8–13)
RBC # BLD: 3.82 M/UL (ref 4.2–5.9)
REPORT: NORMAL
RESPIRATORY PANEL PCR: NORMAL
SODIUM BLD-SCNC: 141 MMOL/L (ref 136–145)
TOTAL PROTEIN: 5.6 G/DL (ref 6.4–8.2)
WBC # BLD: 11.3 K/UL (ref 4–11)

## 2018-07-29 PROCEDURE — 6370000000 HC RX 637 (ALT 250 FOR IP): Performed by: INTERNAL MEDICINE

## 2018-07-29 PROCEDURE — 36415 COLL VENOUS BLD VENIPUNCTURE: CPT

## 2018-07-29 PROCEDURE — 6370000000 HC RX 637 (ALT 250 FOR IP): Performed by: HOSPITALIST

## 2018-07-29 PROCEDURE — 83735 ASSAY OF MAGNESIUM: CPT

## 2018-07-29 PROCEDURE — 94761 N-INVAS EAR/PLS OXIMETRY MLT: CPT

## 2018-07-29 PROCEDURE — 99232 SBSQ HOSP IP/OBS MODERATE 35: CPT | Performed by: INTERNAL MEDICINE

## 2018-07-29 PROCEDURE — 84100 ASSAY OF PHOSPHORUS: CPT

## 2018-07-29 PROCEDURE — 2580000003 HC RX 258: Performed by: HOSPITALIST

## 2018-07-29 PROCEDURE — 6360000002 HC RX W HCPCS: Performed by: INTERNAL MEDICINE

## 2018-07-29 PROCEDURE — 94640 AIRWAY INHALATION TREATMENT: CPT

## 2018-07-29 PROCEDURE — 6360000002 HC RX W HCPCS: Performed by: HOSPITALIST

## 2018-07-29 PROCEDURE — 2060000000 HC ICU INTERMEDIATE R&B

## 2018-07-29 PROCEDURE — 83880 ASSAY OF NATRIURETIC PEPTIDE: CPT

## 2018-07-29 PROCEDURE — 2700000000 HC OXYGEN THERAPY PER DAY

## 2018-07-29 PROCEDURE — 85610 PROTHROMBIN TIME: CPT

## 2018-07-29 PROCEDURE — 80076 HEPATIC FUNCTION PANEL: CPT

## 2018-07-29 PROCEDURE — 80048 BASIC METABOLIC PNL TOTAL CA: CPT

## 2018-07-29 PROCEDURE — 85025 COMPLETE CBC W/AUTO DIFF WBC: CPT

## 2018-07-29 RX ORDER — PREDNISONE 10 MG/1
20 TABLET ORAL 2 TIMES DAILY
Status: DISCONTINUED | OUTPATIENT
Start: 2018-07-29 | End: 2018-07-31 | Stop reason: HOSPADM

## 2018-07-29 RX ORDER — POLYETHYLENE GLYCOL 3350 17 G/17G
17 POWDER, FOR SOLUTION ORAL DAILY
Status: DISCONTINUED | OUTPATIENT
Start: 2018-07-29 | End: 2018-07-31 | Stop reason: HOSPADM

## 2018-07-29 RX ADMIN — IPRATROPIUM BROMIDE AND ALBUTEROL SULFATE 1 AMPULE: .5; 3 SOLUTION RESPIRATORY (INHALATION) at 00:00

## 2018-07-29 RX ADMIN — PIPERACILLIN AND TAZOBACTAM 3.38 G: 3; .375 INJECTION, POWDER, FOR SOLUTION INTRAVENOUS at 02:00

## 2018-07-29 RX ADMIN — Medication 10 ML: at 08:48

## 2018-07-29 RX ADMIN — IPRATROPIUM BROMIDE AND ALBUTEROL SULFATE 1 AMPULE: .5; 3 SOLUTION RESPIRATORY (INHALATION) at 08:13

## 2018-07-29 RX ADMIN — TAMSULOSIN HYDROCHLORIDE 0.4 MG: 0.4 CAPSULE ORAL at 08:48

## 2018-07-29 RX ADMIN — PIPERACILLIN AND TAZOBACTAM 3.38 G: 3; .375 INJECTION, POWDER, FOR SOLUTION INTRAVENOUS at 09:30

## 2018-07-29 RX ADMIN — PANTOPRAZOLE SODIUM 40 MG: 40 TABLET, DELAYED RELEASE ORAL at 06:13

## 2018-07-29 RX ADMIN — IPRATROPIUM BROMIDE AND ALBUTEROL SULFATE 1 AMPULE: .5; 3 SOLUTION RESPIRATORY (INHALATION) at 04:00

## 2018-07-29 RX ADMIN — DOCUSATE SODIUM 100 MG: 100 CAPSULE, LIQUID FILLED ORAL at 20:31

## 2018-07-29 RX ADMIN — BENZONATATE 100 MG: 100 CAPSULE ORAL at 17:13

## 2018-07-29 RX ADMIN — IPRATROPIUM BROMIDE AND ALBUTEROL SULFATE 1 AMPULE: .5; 3 SOLUTION RESPIRATORY (INHALATION) at 17:20

## 2018-07-29 RX ADMIN — ENOXAPARIN SODIUM 40 MG: 100 INJECTION SUBCUTANEOUS at 08:49

## 2018-07-29 RX ADMIN — Medication 10 ML: at 20:31

## 2018-07-29 RX ADMIN — METHYLPREDNISOLONE SODIUM SUCCINATE 60 MG: 125 INJECTION, POWDER, FOR SOLUTION INTRAMUSCULAR; INTRAVENOUS at 08:48

## 2018-07-29 RX ADMIN — Medication 1 TABLET: at 08:48

## 2018-07-29 RX ADMIN — FUROSEMIDE 20 MG: 10 INJECTION, SOLUTION INTRAMUSCULAR; INTRAVENOUS at 08:48

## 2018-07-29 RX ADMIN — PANTOPRAZOLE SODIUM 40 MG: 40 TABLET, DELAYED RELEASE ORAL at 17:11

## 2018-07-29 RX ADMIN — PIPERACILLIN AND TAZOBACTAM 3.38 G: 3; .375 INJECTION, POWDER, FOR SOLUTION INTRAVENOUS at 17:11

## 2018-07-29 RX ADMIN — DOCUSATE SODIUM 100 MG: 100 CAPSULE, LIQUID FILLED ORAL at 08:48

## 2018-07-29 RX ADMIN — IPRATROPIUM BROMIDE AND ALBUTEROL SULFATE 1 AMPULE: .5; 3 SOLUTION RESPIRATORY (INHALATION) at 20:25

## 2018-07-29 RX ADMIN — ATORVASTATIN CALCIUM 10 MG: 10 TABLET, FILM COATED ORAL at 08:48

## 2018-07-29 RX ADMIN — PREDNISONE 20 MG: 10 TABLET ORAL at 20:31

## 2018-07-29 RX ADMIN — POLYETHYLENE GLYCOL 3350 17 G: 17 POWDER, FOR SOLUTION ORAL at 08:48

## 2018-07-29 RX ADMIN — ASPIRIN 325 MG: 325 TABLET ORAL at 08:48

## 2018-07-29 RX ADMIN — TIOTROPIUM BROMIDE 18 MCG: 18 CAPSULE ORAL; RESPIRATORY (INHALATION) at 08:13

## 2018-07-29 NOTE — PROGRESS NOTES
Hospitalist Progress Note      PCP: Pablo Sutton    Date of Admission: 7/25/2018    Chief Complaint: shortness of breath      Hospital Course: feels slightly better. Oxygen need is coming down steadily. Tolerating Lasix well. Subjective: Has shortness of breath with exertion, no chest pain, no nausea or vomiting. Others reviewed in complete detail and found negative. Patient feels he is slightly better today compared to yesterday      Medications:  Reviewed    Infusion Medications   Scheduled Medications    polyethylene glycol  17 g Oral Daily    methylPREDNISolone  60 mg Intravenous Q12H    furosemide  20 mg Intravenous Daily    ipratropium-albuterol  1 ampule Inhalation Q4H    aspirin  325 mg Oral Daily    atorvastatin  10 mg Oral Daily    docusate sodium  100 mg Oral BID    pantoprazole  40 mg Oral BID AC    tamsulosin  0.4 mg Oral Daily    therapeutic multivitamin-minerals  1 tablet Oral Daily    tiotropium  18 mcg Inhalation Daily    piperacillin-tazobactam  3.375 g Intravenous Q8H    sodium chloride flush  10 mL Intravenous 2 times per day    enoxaparin  40 mg Subcutaneous Daily     PRN Meds: albuterol sulfate HFA, benzonatate, cyclobenzaprine, oxyCODONE-acetaminophen, sodium chloride flush, magnesium hydroxide, ondansetron, potassium chloride **OR** potassium chloride **OR** potassium chloride, magnesium sulfate, acetaminophen      Intake/Output Summary (Last 24 hours) at 07/29/18 1415  Last data filed at 07/29/18 1117   Gross per 24 hour   Intake              390 ml   Output             2550 ml   Net            -2160 ml       Physical Exam Performed:    /70   Pulse 99   Temp 98.6 °F (37 °C)   Resp 18   Ht 5' 8\" (1.727 m)   Wt 138 lb 6.4 oz (62.8 kg)   SpO2 92%   BMI 21.04 kg/m²     General appearance: No apparent distress, appears stated age and cooperative. HEENT: Pupils equal, round, and reactive to light. Conjunctivae/corneas clear.   Neck: Supple, with full range of motion. No jugular venous distention. Trachea midline. Respiratory:  Normal respiratory effort. Scattered posterior crackles  Cardiovascular: Regular rate and rhythm with normal S1/S2 without murmurs, rubs or gallops. Abdomen: Soft, non-tender, non-distended with normal bowel sounds. Musculoskeletal: Has clubbing, no cyanosis or edema bilaterally. Full range of motion without deformity. Skin: Skin color, texture, turgor normal.  No rashes or lesions. Neurologic:  Neurovascularly intact without any focal sensory/motor deficits. Cranial nerves: II-XII intact, grossly non-focal.  Psychiatric: Alert and oriented, thought content appropriate, normal insight  Capillary Refill: Brisk,< 3 seconds   Peripheral Pulses: +2 palpable, equal bilaterally     I examined the patient today(07/29/18). Physical exam is similar to yesterday (7/28). Labs:   Recent Labs      07/27/18 0445  07/28/18   0551  07/29/18   0530   WBC  12.3*  14.4*  11.3*   HGB  11.2*  11.7*  11.6*   HCT  32.2*  34.3*  34.0*   PLT  186  226  234     Recent Labs      07/27/18 0445  07/28/18   0551  07/29/18   0530   NA  141  142  141   K  3.5  3.6  3.6   CL  106  104  101   CO2  26  27  30   BUN  13  15  17   CREATININE  0.6*  0.6*  0.7*   CALCIUM  9.0  8.7  8.7   PHOS  1.8*  2.6  2.9     Recent Labs      07/27/18 0445 07/28/18   0551  07/29/18   0530   AST  16  18  17   ALT  11  15  16   BILIDIR  <0.2  <0.2  <0.2   BILITOT  0.4  0.5  0.5   ALKPHOS  109  112  109     Recent Labs      07/27/18   0445  07/28/18   0551  07/29/18   0530   INR  1.32*  1.31*  1.27*     No results for input(s): Chary Bones in the last 72 hours.     Urinalysis:      Lab Results   Component Value Date    NITRU Negative 07/26/2018    WBCUA 0-2 07/26/2018    BACTERIA 1+ 07/26/2018    RBCUA None seen 07/26/2018    BLOODU Negative 07/26/2018    SPECGRAV <=1.005 07/26/2018    GLUCOSEU Negative 07/26/2018       Radiology:  XR CHEST PORTABLE   Final Result   Advanced asbestosis. CTA PULMONARY W CONTRAST   Final Result   No evidence of pulmonary embolism or aortic dissection. Continued evidence of interstitial fibrosis, within apical basilar gradient,   honeycomb formation, and evidence of temporal heterogeneity, the combination   of which is suggestion of UIP. Superimposed upon this, there is possible increase consolidation seen within   the left lower lung, and therefore component pneumonia or aspiration would be   considered. Multiple calcified pleural plaques, suggesting possible asbestos exposure. XR CHEST PORTABLE   Final Result   Spurring is seen in the spine and shoulder joints      Increased pleural-parenchymal disease bilaterally, on a background of   pulmonary emphysema and fibrosis. Calcified pleural plaques also noted                 Assessment/Plan:    Active Hospital Problems    Diagnosis Date Noted    Hypotension [I95.9] 07/25/2018    GERD (gastroesophageal reflux disease) [K21.9] 07/25/2018    Pulmonary fibrosis ([address] [J84.10] 07/25/2018    HCAP (healthcare-associated pneumonia) [J18.9] 07/25/2018    Chronic diastolic CHF (grade 2 LVDD) [I50.32] 07/25/2018    Pulmonary hypertension [I27.20] 07/25/2018    Hyperlipidemia [E78.5] 06/30/2018    Acute on chronic respiratory failure with hypoxemia (HCC) [J96.21] 04/17/2015    COPD exacerbation (HCC) [J44.1] 04/17/2015    HTN (hypertension) [I10] 04/17/2015    Hyponatremia [E87.1] 04/17/2015       PLAN:    Acute on chronic hypoxemic respiratory failure  Multifactorial. May have a component of CHF. Oxygen requirement is improving with diuresis. Continue same. Monitor I/O    Acute on chronic diastolic CHF  Tolerating Lasix well. Continue. Negative fluid balance noted. No changes    Pneumonia, presumed gram-negative:  Continue IV Zosyn. Vancomycin stopped due to nasal MRSA negative    Hyponatremia:  Resolved.  Monitor daily BMP    Dyslipidemia  Continue Lipitor 10 mg po

## 2018-07-29 NOTE — PROGRESS NOTES
Spontaneous cough  Cough: Strong, spontaneous, non-productive = 0    Vital Signs   /65   Pulse 94   Temp 97.9 °F (36.6 °C) (Oral)   Resp 16   Ht 5' 8\" (1.727 m)   Wt 138 lb 6.4 oz (62.8 kg)   SpO2 98%   BMI 21.04 kg/m²   SPO2 (COPD values may differ): 86-87% on room air or greater than 92% on FiO2 35- 50% = 3    Peak Flow (asthma only): not applicable = 0    RSI: 7-8 = BID and Q4HPRN (every four hours as needed) for dyspnea        Plan       Goals: medication delivery and mobilize retained secretions    Patient/caregiver was educated on the proper method of use for Respiratory Care Devices:  No: not assessed with tx       Level of patient/caregiver understanding able to:   [] Verbalize understanding   [] Demonstrate understanding       [] Teach back        [] Needs reinforcement       []  No available caregiver               []  Other:     Response to education:  not assessed with tx      Is patient being placed on Home Treatment Regimen? No     Does the patient have everything they need prior to discharge? NA     Comments: pt seen on rounds, eval at bedside     Plan of Care: Albuterol inh Q4 PRN, Duoneb HHN Q4, Spiriva HH daily     Electronically signed by Nicolas Villavicencio RCP on 7/29/2018 at 5:37 AM    Respiratory Protocol Guidelines     1. Assessment and treatment by Respiratory Therapy will be initiated for medication and therapeutic interventions upon initiation of aerosolized medication. 2. Physician will be contacted for respiratory rate (RR) greater than 35 breaths per minute. Therapy will be held for heart rate (HR) greater than 140 beats per minute, pending direction from physician. 3. Bronchodilators will be administered via Metered Dose Inhaler (MDI) with spacer when the following criteria are met:  a. Alert and cooperative     b. HR < 140 bpm  c. RR < 30 bpm                d. Can demonstrate a 23 second inspiratory hold  4.  Bronchodilators will be administered via Hand Held Nebulizer

## 2018-07-30 LAB
ALBUMIN SERPL-MCNC: 3 G/DL (ref 3.4–5)
ALP BLD-CCNC: 112 U/L (ref 40–129)
ALT SERPL-CCNC: 20 U/L (ref 10–40)
ANION GAP SERPL CALCULATED.3IONS-SCNC: 9 MMOL/L (ref 3–16)
AST SERPL-CCNC: 18 U/L (ref 15–37)
BANDED NEUTROPHILS RELATIVE PERCENT: 3 % (ref 0–7)
BASOPHILS ABSOLUTE: 0 K/UL (ref 0–0.2)
BASOPHILS RELATIVE PERCENT: 0 %
BILIRUB SERPL-MCNC: 0.7 MG/DL (ref 0–1)
BILIRUBIN DIRECT: <0.2 MG/DL (ref 0–0.3)
BILIRUBIN, INDIRECT: ABNORMAL MG/DL (ref 0–1)
BLOOD CULTURE, ROUTINE: NORMAL
BUN BLDV-MCNC: 18 MG/DL (ref 7–20)
CALCIUM SERPL-MCNC: 8.7 MG/DL (ref 8.3–10.6)
CHLORIDE BLD-SCNC: 100 MMOL/L (ref 99–110)
CO2: 32 MMOL/L (ref 21–32)
CREAT SERPL-MCNC: 0.6 MG/DL (ref 0.8–1.3)
CULTURE, BLOOD 2: NORMAL
EOSINOPHILS ABSOLUTE: 0 K/UL (ref 0–0.6)
EOSINOPHILS RELATIVE PERCENT: 0 %
GFR AFRICAN AMERICAN: >60
GFR NON-AFRICAN AMERICAN: >60
GLUCOSE BLD-MCNC: 125 MG/DL (ref 70–99)
HCT VFR BLD CALC: 36 % (ref 40.5–52.5)
HEMOGLOBIN: 12.3 G/DL (ref 13.5–17.5)
INR BLD: 1.2 (ref 0.86–1.14)
LYMPHOCYTES ABSOLUTE: 1 K/UL (ref 1–5.1)
LYMPHOCYTES RELATIVE PERCENT: 9 %
MAGNESIUM: 1.9 MG/DL (ref 1.8–2.4)
MCH RBC QN AUTO: 30.4 PG (ref 26–34)
MCHC RBC AUTO-ENTMCNC: 34.1 G/DL (ref 31–36)
MCV RBC AUTO: 89.3 FL (ref 80–100)
MONOCYTES ABSOLUTE: 0.6 K/UL (ref 0–1.3)
MONOCYTES RELATIVE PERCENT: 6 %
MYCOPLASMA PNEUMONIAE IGM: 0.12
NEUTROPHILS ABSOLUTE: 9 K/UL (ref 1.7–7.7)
NEUTROPHILS RELATIVE PERCENT: 82 %
PDW BLD-RTO: 14.6 % (ref 12.4–15.4)
PHOSPHORUS: 3.2 MG/DL (ref 2.5–4.9)
PLATELET # BLD: 249 K/UL (ref 135–450)
PMV BLD AUTO: 7.6 FL (ref 5–10.5)
POTASSIUM SERPL-SCNC: 3.8 MMOL/L (ref 3.5–5.1)
PRO-BNP: 1250 PG/ML (ref 0–449)
PROTHROMBIN TIME: 13.7 SEC (ref 9.8–13)
RBC # BLD: 4.03 M/UL (ref 4.2–5.9)
RBC # BLD: NORMAL 10*6/UL
SODIUM BLD-SCNC: 141 MMOL/L (ref 136–145)
TOTAL PROTEIN: 5.8 G/DL (ref 6.4–8.2)
WBC # BLD: 10.6 K/UL (ref 4–11)

## 2018-07-30 PROCEDURE — 83880 ASSAY OF NATRIURETIC PEPTIDE: CPT

## 2018-07-30 PROCEDURE — 85610 PROTHROMBIN TIME: CPT

## 2018-07-30 PROCEDURE — 94640 AIRWAY INHALATION TREATMENT: CPT

## 2018-07-30 PROCEDURE — 2580000003 HC RX 258: Performed by: HOSPITALIST

## 2018-07-30 PROCEDURE — G8988 SELF CARE GOAL STATUS: HCPCS

## 2018-07-30 PROCEDURE — 6360000002 HC RX W HCPCS: Performed by: HOSPITALIST

## 2018-07-30 PROCEDURE — 97161 PT EVAL LOW COMPLEX 20 MIN: CPT

## 2018-07-30 PROCEDURE — 6360000002 HC RX W HCPCS: Performed by: INTERNAL MEDICINE

## 2018-07-30 PROCEDURE — 94761 N-INVAS EAR/PLS OXIMETRY MLT: CPT

## 2018-07-30 PROCEDURE — 97535 SELF CARE MNGMENT TRAINING: CPT

## 2018-07-30 PROCEDURE — 6370000000 HC RX 637 (ALT 250 FOR IP): Performed by: INTERNAL MEDICINE

## 2018-07-30 PROCEDURE — 6370000000 HC RX 637 (ALT 250 FOR IP): Performed by: HOSPITALIST

## 2018-07-30 PROCEDURE — 97116 GAIT TRAINING THERAPY: CPT

## 2018-07-30 PROCEDURE — 85025 COMPLETE CBC W/AUTO DIFF WBC: CPT

## 2018-07-30 PROCEDURE — G8987 SELF CARE CURRENT STATUS: HCPCS

## 2018-07-30 PROCEDURE — 2060000000 HC ICU INTERMEDIATE R&B

## 2018-07-30 PROCEDURE — 80048 BASIC METABOLIC PNL TOTAL CA: CPT

## 2018-07-30 PROCEDURE — 80076 HEPATIC FUNCTION PANEL: CPT

## 2018-07-30 PROCEDURE — 84100 ASSAY OF PHOSPHORUS: CPT

## 2018-07-30 PROCEDURE — 83735 ASSAY OF MAGNESIUM: CPT

## 2018-07-30 PROCEDURE — 2700000000 HC OXYGEN THERAPY PER DAY

## 2018-07-30 PROCEDURE — G8978 MOBILITY CURRENT STATUS: HCPCS

## 2018-07-30 PROCEDURE — G8979 MOBILITY GOAL STATUS: HCPCS

## 2018-07-30 PROCEDURE — 97166 OT EVAL MOD COMPLEX 45 MIN: CPT

## 2018-07-30 PROCEDURE — 36415 COLL VENOUS BLD VENIPUNCTURE: CPT

## 2018-07-30 RX ORDER — AMOXICILLIN AND CLAVULANATE POTASSIUM 875; 125 MG/1; MG/1
1 TABLET, FILM COATED ORAL 2 TIMES DAILY WITH MEALS
Status: DISCONTINUED | OUTPATIENT
Start: 2018-07-30 | End: 2018-07-31 | Stop reason: HOSPADM

## 2018-07-30 RX ADMIN — FUROSEMIDE 20 MG: 10 INJECTION, SOLUTION INTRAMUSCULAR; INTRAVENOUS at 09:31

## 2018-07-30 RX ADMIN — TIOTROPIUM BROMIDE 18 MCG: 18 CAPSULE ORAL; RESPIRATORY (INHALATION) at 07:35

## 2018-07-30 RX ADMIN — Medication 1 TABLET: at 09:30

## 2018-07-30 RX ADMIN — PIPERACILLIN AND TAZOBACTAM 3.38 G: 3; .375 INJECTION, POWDER, FOR SOLUTION INTRAVENOUS at 10:58

## 2018-07-30 RX ADMIN — PREDNISONE 20 MG: 10 TABLET ORAL at 21:16

## 2018-07-30 RX ADMIN — IPRATROPIUM BROMIDE AND ALBUTEROL SULFATE 1 AMPULE: .5; 3 SOLUTION RESPIRATORY (INHALATION) at 07:34

## 2018-07-30 RX ADMIN — Medication 10 ML: at 21:17

## 2018-07-30 RX ADMIN — Medication 10 ML: at 09:34

## 2018-07-30 RX ADMIN — POLYETHYLENE GLYCOL 3350 17 G: 17 POWDER, FOR SOLUTION ORAL at 09:30

## 2018-07-30 RX ADMIN — IPRATROPIUM BROMIDE AND ALBUTEROL SULFATE 1 AMPULE: .5; 3 SOLUTION RESPIRATORY (INHALATION) at 11:28

## 2018-07-30 RX ADMIN — AMOXICILLIN AND CLAVULANATE POTASSIUM 1 TABLET: 875; 125 TABLET, FILM COATED ORAL at 17:17

## 2018-07-30 RX ADMIN — IPRATROPIUM BROMIDE AND ALBUTEROL SULFATE 1 AMPULE: .5; 3 SOLUTION RESPIRATORY (INHALATION) at 00:28

## 2018-07-30 RX ADMIN — DOCUSATE SODIUM 100 MG: 100 CAPSULE, LIQUID FILLED ORAL at 21:16

## 2018-07-30 RX ADMIN — IPRATROPIUM BROMIDE AND ALBUTEROL SULFATE 1 AMPULE: .5; 3 SOLUTION RESPIRATORY (INHALATION) at 15:38

## 2018-07-30 RX ADMIN — ASPIRIN 325 MG: 325 TABLET ORAL at 09:30

## 2018-07-30 RX ADMIN — TAMSULOSIN HYDROCHLORIDE 0.4 MG: 0.4 CAPSULE ORAL at 09:30

## 2018-07-30 RX ADMIN — IPRATROPIUM BROMIDE AND ALBUTEROL SULFATE 1 AMPULE: .5; 3 SOLUTION RESPIRATORY (INHALATION) at 20:52

## 2018-07-30 RX ADMIN — PANTOPRAZOLE SODIUM 40 MG: 40 TABLET, DELAYED RELEASE ORAL at 05:07

## 2018-07-30 RX ADMIN — IPRATROPIUM BROMIDE AND ALBUTEROL SULFATE 1 AMPULE: .5; 3 SOLUTION RESPIRATORY (INHALATION) at 04:08

## 2018-07-30 RX ADMIN — PREDNISONE 20 MG: 10 TABLET ORAL at 09:30

## 2018-07-30 RX ADMIN — PANTOPRAZOLE SODIUM 40 MG: 40 TABLET, DELAYED RELEASE ORAL at 17:17

## 2018-07-30 RX ADMIN — PIPERACILLIN AND TAZOBACTAM 3.38 G: 3; .375 INJECTION, POWDER, FOR SOLUTION INTRAVENOUS at 03:11

## 2018-07-30 RX ADMIN — IPRATROPIUM BROMIDE AND ALBUTEROL SULFATE 1 AMPULE: .5; 3 SOLUTION RESPIRATORY (INHALATION) at 23:56

## 2018-07-30 RX ADMIN — ENOXAPARIN SODIUM 40 MG: 100 INJECTION SUBCUTANEOUS at 09:30

## 2018-07-30 RX ADMIN — ATORVASTATIN CALCIUM 10 MG: 10 TABLET, FILM COATED ORAL at 09:30

## 2018-07-30 ASSESSMENT — PAIN SCALES - GENERAL
PAINLEVEL_OUTOF10: 0
PAINLEVEL_OUTOF10: 0

## 2018-07-30 NOTE — PROGRESS NOTES
Status: Within Normal Limits    Social/Functional History  Social/Functional History  Lives With: Spouse  Type of Home: House  Home Layout: Two level, Able to Live on Main level with bedroom/bathroom  Home Access: Stairs to enter with rails  Entrance Stairs - Number of Steps: 3 ALVAREZ through back entrance  Entrance Stairs - Rails: Right  Bathroom Shower/Tub: Walk-in shower  Bathroom Toilet: Handicap height  Bathroom Equipment: Shower chair, Hand-held shower, Grab bars in shower  Home Equipment: Oxygen, Rolling walker (3-4LO2)  Receives Help From: Home health (Home OT/PT and RN)  ADL Assistance: Independent  Homemaking Responsibilities:  (Wife performs housework)  Ambulation Assistance: Independent (Uses RW occassionally)  Transfer Assistance: Independent  Active : Yes  Occupation: Retired  Type of occupation: [employer]  Objective             Strength RLE  Strength RLE: WFL  Comment: grossly 4-/5 to 4/5  Strength LLE  Strength LLE: WFL  Comment: grossly 4-/5 to 4/5     Sensation  Overall Sensation Status: WNL  Bed mobility  Supine to Sit: Stand by assistance (with HOB minimally elevated)  Sit to Supine: Unable to assess (pt in chair at end of session)  Transfers  Sit to Stand: Contact guard assistance (from EOB to RW)  Stand to sit: Contact guard assistance  Bed to Chair: Contact guard assistance  Ambulation  Ambulation?: Yes  Ambulation 1  Surface: level tile  Device: Rolling Walker  Assistance: Contact guard assistance  Quality of Gait: decreased velocity, decreased step legnths, decreased heel strike, forwrad flexed posture,   Distance: 25 ft  Comments: therapist managed O2 lines/IV. pt Spo2 decreased to 72% following bout of gait, returned to 90% within 3 min of seated rest and PLB on 4 L O2. Assessment   Body structures, Functions, Activity limitations: Decreased functional mobility ; Decreased balance;Decreased strength;Decreased endurance  Assessment: pt is 80year old male admitted with acute on chronic respiratory failure. pt previously ind wiht no AD for funcitonal mobility. angelitaenltjami requires CGA for transfers/gait with use of RW. pt limtied by O2 demands, SPo2 decreasing to 72% following 1 bout of gait required increased time and rest break to return to WNL. At this time, recommend SNF upon d/c for continued skilled therapy to increase safety/ind with funcitonal mobility and increase activity tolerance. Treatment Diagnosis: decreased funcitonal mobility  Prognosis: Good  Decision Making: Low Complexity  Patient Education: role of PT, use of call light, safe transfers/gait, use of AD, poc, d/c rec, PLB  REQUIRES PT FOLLOW UP: Yes  Activity Tolerance  Activity Tolerance: Patient limited by endurance  Activity Tolerance: pt on 4 L O2, see gait section for details. Plan   Plan  Times per week: 3-5x/week  Times per day: Daily  Current Treatment Recommendations: Strengthening, Transfer Training, Endurance Training, Balance Training, Gait Training, Functional Mobility Training, Stair training, Safety Education & Training  Safety Devices  Type of devices: All fall risk precautions in place, Gait belt, Call light within reach, Chair alarm in place, Left in chair (wife present at end of session)    G-Code  PT G-Codes  Functional Assessment Tool Used: [facility]  Score: 16  Functional Limitation: Mobility: Walking and moving around  Mobility: Walking and Moving Around Current Status (): At least 40 percent but less than 60 percent impaired, limited or restricted  Mobility: Walking and Moving Around Goal Status ():  At least 20 percent but less than 40 percent impaired, limited or restricted  OutComes Score                                           AM-PAC Score  AM-PAC Inpatient Mobility Raw Score : 16  AM-PAC Inpatient T-Scale Score : 40.78  Mobility Inpatient CMS 0-100% Score: 54.16  Mobility Inpatient CMS G-Code Modifier : CK          Goals  Short term goals  Time Frame for Short term

## 2018-07-30 NOTE — PLAN OF CARE
Problem: OXYGENATION/RESPIRATORY FUNCTION  Goal: Patient will achieve/maintain normal respiratory rate/effort  Respiratory rate and effort will be within normal limits for the patient    Outcome: Ongoing  Patient's EF (Ejection Fraction) is greater than 40%    Patient has a past medical history of Aneurysm ([facility] CAD (coronary artery disease); Cancer ([facility] Cancer ([facility] Chronic diastolic CHF (grade 2 LVDD); COPD (chronic obstructive pulmonary disease) ([facility] Heart murmur; Hypercholesteremia; Hypertension; and Influenza A. Comorbidities reviewed and education provided. Patient and family's stated goal of care: increase activity tolerance prior to discharge    Patient's current functional capacity:  Slight limitation of physical activity. Comfortable at rest. Ordinary physical activity results in fatigue, palpitation, dyspnea. Pt sitting in bed at this time on 4 L O2. Pt denies shortness of breath. Pt without lower extremity edema. Patient's weights and intake/output reviewed:    Patient Vitals for the past 96 hrs (Last 3 readings):   Weight   07/30/18 0542 136 lb 11.2 oz (62 kg)   07/29/18 0459 138 lb 6.4 oz (62.8 kg)   07/28/18 0444 137 lb 12.6 oz (62.5 kg)       Intake/Output Summary (Last 24 hours) at 07/30/18 1125  Last data filed at 07/30/18 1057   Gross per 24 hour   Intake              970 ml   Output             1800 ml   Net             -830 ml       Patient provided with education on CHF signs/symptoms, causes, discharge medications, daily weights, low sodium diet, activity, and follow-up. Notified patient to call the doctor post discharge if patient experiences shortness of breath, chest pain, swelling, cough, or weight gain of three pounds in a day/five pounds in a week. Also notified patient to call the doctor with dizziness, increased fatigue, decreased or difficulty urinating. Pt verbalized understanding. No additional questions at this time.     Education Time: 10 Minutes

## 2018-07-30 NOTE — CARE COORDINATION
7/30/18 F/U with pt about PT/OT recs for SNF. Pt and his wife refuse SNF. Will go home with [facility] level 3.

## 2018-07-30 NOTE — PLAN OF CARE
Problem: Falls - Risk of:  Goal: Will remain free from falls  Will remain free from falls   Outcome: Ongoing  Patient free of falls at this time. Pt alert and oriented. Bed locked and in low position. Call light within reach. Will continue to monitor. Problem: OXYGENATION/RESPIRATORY FUNCTION  Goal: Patient will maintain patent airway  Outcome: Ongoing  Pt wears C-PAP 25% FIO2 at HS. PT tolerating well. O2 sat 100%  Patient's EF (Ejection Fraction) is greater than 40%    Patient has a past medical history of Aneurysm ([facility] CAD (coronary artery disease); Cancer ([facility] ([facility] Chronic diastolic CHF (grade 2 LVDD); COPD (chronic obstructive pulmonary disease) ([facility] Heart murmur; Hypercholesteremia; Hypertension; and Influenza A. Comorbidities reviewed and education provided. Patient and family's stated goal of care: reduce shortness of breath prior to discharge    Patient's current functional capacity:  Marked limitation of physical activity. Comfortable at rest. Less than ordinary activity causes fatigue, palpitation, or dyspnea. Pt resting in bed at this time on 4L NC O2. Pt denies shortness of breath. Pt with nonpitting lower extremity edema. Patient's weights and intake/output reviewed:    Patient Vitals for the past 96 hrs (Last 3 readings):   Weight   07/29/18 0459 138 lb 6.4 oz (62.8 kg)   07/28/18 0444 137 lb 12.6 oz (62.5 kg)   07/27/18 0550 138 lb 3.7 oz (62.7 kg)       Intake/Output Summary (Last 24 hours) at 07/30/18 0113  Last data filed at 07/29/18 2139   Gross per 24 hour   Intake              260 ml   Output             2075 ml   Net            -1815 ml       Patient provided with education on CHF signs/symptoms, causes, discharge medications, daily weights, low sodium diet, activity, and follow-up. Notified patient to call the doctor post discharge if patient experiences shortness of breath, chest pain, swelling, cough, or weight gain of three pounds in a day/five pounds in a week.

## 2018-07-30 NOTE — CARE COORDINATION
[facility]  Writer made aware pt has rec's for SNF but would rather DC back home with [facility]. Will enroll pt in S3 Program with [facility] upon DC, pt is currently active with agency.    HOME HEALTH CARE: LEVEL 3 SAFETY     Home health agency to establish plan of care for patient over 60 day period   Nursing  Initial home SN evaluation visit to occur within 24-48 hours for:  medication management  VS and clinical assessment  S&S chronic disease exacerbation education + when to contact MD / NP  care coordination  Medication Reconciliation during 1st SN visit    PT/OT  Evaluations in home within 24-48 hours of discharge to include DME and home safety   Frontload therapy 5 days, then 3x a week   OT to evaluate if patient has [address] needs for personal care      evaluation within 24-48 hours to evaluate resources & insurance for potential [place], [place], LTC, and Medicaid options     PCP Visit scheduled within 3 - 7 days of hospital discharge    Nacho Arreguin RN [address] [place] [address] with [address]

## 2018-07-30 NOTE — CARE COORDINATION
7/30/18 [facility] PTA, watch IV ABX, PT/OT ordered today, will follow for recs.

## 2018-07-30 NOTE — PROGRESS NOTES
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 O2 sats decreased after mobility to 72% but increased in 2 minutes to 91%.   ADL  Grooming: Setup (wash hands while seated)  LE Dressing: Maximum assistance (unable to reach to feet for socks )  Toileting: Minimal assistance (use urinal )  Tone RUE  RUE Tone: Normotonic  Tone LUE  LUE Tone: Normotonic  Coordination  Movements Are Fluid And Coordinated: Yes     Bed mobility  Supine to Sit: Contact guard assistance (HOB elevated )  Sit to Supine: Unable to assess (Pt sat in chair at end of session )  Transfers  Stand Pivot Transfers: Contact guard assistance (RW)  Sit to stand: Contact guard assistance  Stand to sit: Contact guard assistance     Cognition  Overall Cognitive Status: WFL  Perception  Overall Perceptual Status: WFL     Sensation  Overall Sensation Status: WFL      LUE AROM (degrees)  LUE AROM : WFL  RUE AROM (degrees)  RUE AROM : WFL  LUE Strength  Gross LUE Strength: WFL  RUE Strength  Gross RUE Strength: WFL      Assessment   Performance deficits / Impairments: Decreased functional mobility ; Decreased ADL status; Decreased endurance  After evaluation, pt found to be presenting with the above mentioned occupational performance deficits which are affecting participation in daily living skills. Pt would benefit from continued skilled occupational therapy at [facility] to address ADLs, functional mobility, and safety. Prognosis: Good  Decision Making: Medium Complexity  Patient Education: role of OT, transfers, ADLs  REQUIRES OT FOLLOW UP: Yes  Activity Tolerance  Activity Tolerance: Patient limited by fatigue  Activity Tolerance: 4LO2. O2 sats decreased from 95% to 72% after mobility. Improved to <90% with 2 minute sitting rest.   Safety Devices  Safety Devices in place: Yes  Type of devices: Nurse notified;Gait belt;Call light within reach; Chair alarm in place; Left in chair         Plan   Plan  Times per week: 3-5x/week   Current Treatment Recommendations: Self-Care / ADL, Functional Mobility Training, Endurance Training, Safety Education & Training  G-Code  OT G-codes  Functional Assessment Tool Used: -PAC  Score: 16  Functional Limitation: Self care  Self Care Current Status (): At least 40 percent but less than 60 percent impaired, limited or restricted  Self Care Goal Status ():  At least 20 percent but less than 40 percent impaired, limited or restricted  -PAC Score   AM-[facility] Inpatient Daily Activity Raw Score: 16  AM-PAC Inpatient ADL T-Scale Score : 35.96  ADL Inpatient CMS 0-100% Score: 53.32  ADL Inpatient CMS G-Code Modifier : CK    Goals  Short term goals  Time Frame for Short term goals: for 1 week  Short term goal 1: Perform LE dressing with min A by 8/6  Short term goal 2: Perform 2 grooming tasks standing at sink with SBA   Short term goal 3: Perform UE exer 10-15x each  Short term

## 2018-07-31 VITALS
HEART RATE: 96 BPM | OXYGEN SATURATION: 92 % | HEIGHT: 68 IN | RESPIRATION RATE: 20 BRPM | SYSTOLIC BLOOD PRESSURE: 91 MMHG | TEMPERATURE: 97.9 F | DIASTOLIC BLOOD PRESSURE: 56 MMHG | BODY MASS INDEX: 20.85 KG/M2 | WEIGHT: 137.57 LBS

## 2018-07-31 LAB
ALBUMIN SERPL-MCNC: 3 G/DL (ref 3.4–5)
ANION GAP SERPL CALCULATED.3IONS-SCNC: 8 MMOL/L (ref 3–16)
BUN BLDV-MCNC: 14 MG/DL (ref 7–20)
CALCIUM SERPL-MCNC: 8.7 MG/DL (ref 8.3–10.6)
CHLORIDE BLD-SCNC: 99 MMOL/L (ref 99–110)
CO2: 33 MMOL/L (ref 21–32)
CREAT SERPL-MCNC: 0.6 MG/DL (ref 0.8–1.3)
GFR AFRICAN AMERICAN: >60
GFR NON-AFRICAN AMERICAN: >60
GLUCOSE BLD-MCNC: 115 MG/DL (ref 70–99)
HCT VFR BLD CALC: 36.6 % (ref 40.5–52.5)
HEMOGLOBIN: 12.4 G/DL (ref 13.5–17.5)
INR BLD: 1.19 (ref 0.86–1.14)
MCH RBC QN AUTO: 30.4 PG (ref 26–34)
MCHC RBC AUTO-ENTMCNC: 33.8 G/DL (ref 31–36)
MCV RBC AUTO: 90.2 FL (ref 80–100)
PDW BLD-RTO: 14.5 % (ref 12.4–15.4)
PHOSPHORUS: 3.3 MG/DL (ref 2.5–4.9)
PLATELET # BLD: 225 K/UL (ref 135–450)
PMV BLD AUTO: 7.3 FL (ref 5–10.5)
POTASSIUM SERPL-SCNC: 3.9 MMOL/L (ref 3.5–5.1)
PROTHROMBIN TIME: 13.6 SEC (ref 9.8–13)
RBC # BLD: 4.06 M/UL (ref 4.2–5.9)
SODIUM BLD-SCNC: 140 MMOL/L (ref 136–145)
WBC # BLD: 8.7 K/UL (ref 4–11)

## 2018-07-31 PROCEDURE — 85610 PROTHROMBIN TIME: CPT

## 2018-07-31 PROCEDURE — G8988 SELF CARE GOAL STATUS: HCPCS

## 2018-07-31 PROCEDURE — 2580000003 HC RX 258: Performed by: HOSPITALIST

## 2018-07-31 PROCEDURE — 6360000002 HC RX W HCPCS: Performed by: HOSPITALIST

## 2018-07-31 PROCEDURE — 36415 COLL VENOUS BLD VENIPUNCTURE: CPT

## 2018-07-31 PROCEDURE — 80069 RENAL FUNCTION PANEL: CPT

## 2018-07-31 PROCEDURE — 6360000002 HC RX W HCPCS: Performed by: INTERNAL MEDICINE

## 2018-07-31 PROCEDURE — G8987 SELF CARE CURRENT STATUS: HCPCS

## 2018-07-31 PROCEDURE — 97110 THERAPEUTIC EXERCISES: CPT

## 2018-07-31 PROCEDURE — 97530 THERAPEUTIC ACTIVITIES: CPT

## 2018-07-31 PROCEDURE — 2700000000 HC OXYGEN THERAPY PER DAY

## 2018-07-31 PROCEDURE — 94761 N-INVAS EAR/PLS OXIMETRY MLT: CPT

## 2018-07-31 PROCEDURE — 85027 COMPLETE CBC AUTOMATED: CPT

## 2018-07-31 PROCEDURE — 97116 GAIT TRAINING THERAPY: CPT

## 2018-07-31 PROCEDURE — 6370000000 HC RX 637 (ALT 250 FOR IP): Performed by: HOSPITALIST

## 2018-07-31 PROCEDURE — 94640 AIRWAY INHALATION TREATMENT: CPT

## 2018-07-31 PROCEDURE — 6370000000 HC RX 637 (ALT 250 FOR IP): Performed by: INTERNAL MEDICINE

## 2018-07-31 RX ORDER — PREDNISONE 20 MG/1
20 TABLET ORAL 2 TIMES DAILY
Qty: 5 TABLET | Refills: 0 | Status: SHIPPED | OUTPATIENT
Start: 2018-07-31 | End: 2018-08-03

## 2018-07-31 RX ORDER — FUROSEMIDE 20 MG/1
20 TABLET ORAL DAILY
Qty: 30 TABLET | Refills: 0 | Status: SHIPPED | OUTPATIENT
Start: 2018-07-31 | End: 2019-05-21

## 2018-07-31 RX ORDER — AMOXICILLIN AND CLAVULANATE POTASSIUM 875; 125 MG/1; MG/1
1 TABLET, FILM COATED ORAL 2 TIMES DAILY WITH MEALS
Qty: 10 TABLET | Refills: 0 | Status: SHIPPED | OUTPATIENT
Start: 2018-07-31 | End: 2018-08-05

## 2018-07-31 RX ADMIN — ENOXAPARIN SODIUM 40 MG: 100 INJECTION SUBCUTANEOUS at 10:20

## 2018-07-31 RX ADMIN — IPRATROPIUM BROMIDE AND ALBUTEROL SULFATE 1 AMPULE: .5; 3 SOLUTION RESPIRATORY (INHALATION) at 08:21

## 2018-07-31 RX ADMIN — TAMSULOSIN HYDROCHLORIDE 0.4 MG: 0.4 CAPSULE ORAL at 10:19

## 2018-07-31 RX ADMIN — Medication 10 ML: at 10:21

## 2018-07-31 RX ADMIN — POLYETHYLENE GLYCOL 3350 17 G: 17 POWDER, FOR SOLUTION ORAL at 10:21

## 2018-07-31 RX ADMIN — DOCUSATE SODIUM 100 MG: 100 CAPSULE, LIQUID FILLED ORAL at 10:19

## 2018-07-31 RX ADMIN — TIOTROPIUM BROMIDE 18 MCG: 18 CAPSULE ORAL; RESPIRATORY (INHALATION) at 08:21

## 2018-07-31 RX ADMIN — ASPIRIN 325 MG: 325 TABLET ORAL at 10:19

## 2018-07-31 RX ADMIN — Medication 1 TABLET: at 10:19

## 2018-07-31 RX ADMIN — FUROSEMIDE 20 MG: 10 INJECTION, SOLUTION INTRAMUSCULAR; INTRAVENOUS at 10:20

## 2018-07-31 RX ADMIN — PREDNISONE 20 MG: 10 TABLET ORAL at 10:19

## 2018-07-31 RX ADMIN — IPRATROPIUM BROMIDE AND ALBUTEROL SULFATE 1 AMPULE: .5; 3 SOLUTION RESPIRATORY (INHALATION) at 12:21

## 2018-07-31 RX ADMIN — ATORVASTATIN CALCIUM 10 MG: 10 TABLET, FILM COATED ORAL at 10:20

## 2018-07-31 RX ADMIN — PANTOPRAZOLE SODIUM 40 MG: 40 TABLET, DELAYED RELEASE ORAL at 06:16

## 2018-07-31 RX ADMIN — AMOXICILLIN AND CLAVULANATE POTASSIUM 1 TABLET: 875; 125 TABLET, FILM COATED ORAL at 10:18

## 2018-07-31 NOTE — PROGRESS NOTES
without deformity. Skin: Skin color, texture, turgor normal.  No rashes or lesions. Neurologic:  Neurovascularly intact without any focal sensory/motor deficits. Cranial nerves: II-XII intact, grossly non-focal.  Psychiatric: Alert and oriented, thought content appropriate, normal insight  Capillary Refill: Brisk,< 3 seconds   Peripheral Pulses: +2 palpable, equal bilaterally     I examined the patient today(07/31/18). Physical exam is similar to yesterday (7/28). Labs:   Recent Labs      07/29/18   0530  07/30/18   0503  07/31/18   0453   WBC  11.3*  10.6  8.7   HGB  11.6*  12.3*  12.4*   HCT  34.0*  36.0*  36.6*   PLT  234  249  225     Recent Labs      07/29/18   0530  07/30/18   0503  07/31/18   0453   NA  141  141  140   K  3.6  3.8  3.9   CL  101  100  99   CO2  30  32  33*   BUN  17  18  14   CREATININE  0.7*  0.6*  0.6*   CALCIUM  8.7  8.7  8.7   PHOS  2.9  3.2  3.3     Recent Labs      07/29/18   0530  07/30/18   0503   AST  17  18   ALT  16  20   BILIDIR  <0.2  <0.2   BILITOT  0.5  0.7   ALKPHOS  109  112     Recent Labs      07/29/18   0530  07/30/18   0503  07/31/18   0453   INR  1.27*  1.20*  1.19*     No results for input(s): Priscila Robert in the last 72 hours. Urinalysis:      Lab Results   Component Value Date    NITRU Negative 07/26/2018    WBCUA 0-2 07/26/2018    BACTERIA 1+ 07/26/2018    RBCUA None seen 07/26/2018    BLOODU Negative 07/26/2018    SPECGRAV <=1.005 07/26/2018    GLUCOSEU Negative 07/26/2018       Radiology:  XR CHEST PORTABLE   Final Result   Advanced asbestosis. CTA PULMONARY W CONTRAST   Final Result   No evidence of pulmonary embolism or aortic dissection. Continued evidence of interstitial fibrosis, within apical basilar gradient,   honeycomb formation, and evidence of temporal heterogeneity, the combination   of which is suggestion of UIP.       Superimposed upon this, there is possible increase consolidation seen within   the left lower lung, and

## 2018-07-31 NOTE — DISCHARGE SUMMARY
Hospital Medicine Discharge Summary    Patient ID: Isai Oden      Patient's PCP: Michelle Muniz    Admit Date: 7/25/2018     Discharge Date: 7/31/2018      Admitting Physician: Henny Townsend MD     Discharge Physician: Sagar Richter MD     Discharge Diagnoses: Active Hospital Problems    Diagnosis Date Noted    GERD (gastroesophageal reflux disease) [K21.9] 07/25/2018    Pulmonary fibrosis ([facility] [J84.10] 07/25/2018    HCAP (healthcare-associated pneumonia) [J18.9] 07/25/2018    Chronic diastolic CHF (grade 2 LVDD) [I50.32] 07/25/2018    Pulmonary hypertension [I27.20] 07/25/2018    Hyperlipidemia [E78.5] 06/30/2018    Acute on chronic respiratory failure with hypoxemia (HCC) [J96.21] 04/17/2015    COPD exacerbation ([facility] [J44.1] 04/17/2015    HTN (hypertension) [I10] 04/17/2015       The patient was seen and examined on day of discharge and this discharge summary is in conjunction with any daily progress note from day of discharge. Hospital Course:       Acute on chronic hypoxemic respiratory failure. Multifactorial w/ COPD/CHF. Oxygen requirement is improving with diuresis. Continue same. Monitor I/O     Acute on chronic diastolic CHF - Tolerating Lasix well. Continue. Negative fluid balance noted. No changes     Pneumonia, presumed gram-negative - Continue IV Zosyn. Vancomycin stopped due to nasal MRSA negative. Transition to PO at discharge. Sepsis - present on arrival.  Due to pneumonia. Resolved w/ above tx. HypoNatremia - Resolved. Will continue to follow serial labs. Reviewed and documented as above.     HyperLipidemia - controlled on home Statin. Continue, w/ f/u and med adjustment w/ PCP     COPD - w/ acute exacerbation. Tx w/ IV steroids/HHN. Pulmonology consulted and appreciated.          Sepsis, due to pneumonia documented in H&P on 7/26/2018, then dropped.  Please document in progress note and discharge summary if:        Labs:  For convenience and continuity at follow-up the following most recent labs are provided:      CBC:    Lab Results   Component Value Date    WBC 8.7 07/31/2018    HGB 12.4 07/31/2018    HCT 36.6 07/31/2018     07/31/2018       Renal:    Lab Results   Component Value Date     07/31/2018    K 3.9 07/31/2018    K 5.2 07/18/2018    CL 99 07/31/2018    CO2 33 07/31/2018    BUN 14 07/31/2018    CREATININE 0.6 07/31/2018    CALCIUM 8.7 07/31/2018    PHOS 3.3 07/31/2018         Significant Diagnostic Studies    Radiology:   XR CHEST PORTABLE   Final Result   Advanced asbestosis. CTA PULMONARY W CONTRAST   Final Result   No evidence of pulmonary embolism or aortic dissection. Continued evidence of interstitial fibrosis, within apical basilar gradient,   honeycomb formation, and evidence of temporal heterogeneity, the combination   of which is suggestion of UIP. Superimposed upon this, there is possible increase consolidation seen within   the left lower lung, and therefore component pneumonia or aspiration would be   considered. Multiple calcified pleural plaques, suggesting possible asbestos exposure. XR CHEST PORTABLE   Final Result   Spurring is seen in the spine and shoulder joints      Increased pleural-parenchymal disease bilaterally, on a background of   pulmonary emphysema and fibrosis. Calcified pleural plaques also noted                Consults:     IP CONSULT TO HOSPITALIST  IP CONSULT TO PHARMACY  IP CONSULT TO PULMONOLOGY  IP CONSULT TO HOME CARE NEEDS    Disposition:  Home w/ C. Condition at Discharge: Stable    Discharge Instructions/Follow-up:  W/ PCP 2 weeks.      Code Status:  Limited    Activity: activity as tolerated    Diet: regular diet      Discharge Medications:     Discharge Medication List as of 7/31/2018  2:26 PM           Details   predniSONE (DELTASONE) 20 MG tablet Take 1 tablet by mouth 2 times daily for 5 doses, Disp-5 tablet, R-0Print      furosemide (LASIX) 20 MG tablet Take 1 tablet by mouth daily, Disp-30 tablet, R-0Print      amoxicillin-clavulanate (AUGMENTIN) 875-125 MG per tablet Take 1 tablet by mouth 2 times daily (with meals) for 5 days, Disp-10 tablet, R-0Print              Details   Cholecalciferol (VITAMIN D3) 02985 units CAPS Take 1 capsule by mouth every 30 daysHistorical Med      nystatin (MYCOSTATIN) 885846 UNIT/ML suspension Take 10 mLs by mouth 3 times daily, Oral, 3 TIMES DAILY, Historical Med      oxyCODONE-acetaminophen (PERCOCET) 5-325 MG per tablet Take 1 tablet by mouth every 8 hours as needed for Pain. Darci Alejandro Historical Med      atorvastatin (LIPITOR) 10 MG tablet Take 10 mg by mouth dailyHistorical Med      fluticasone-vilanterol (BREO ELLIPTA) 100-25 MCG/INH AEPB inhaler Inhale 1 puff into the lungs dailyHistorical Med      docusate sodium (COLACE, DULCOLAX) 100 MG CAPS Take 100 mg by mouth 2 times daily, Disp-30 capsule, R-3Normal      magnesium hydroxide (MILK OF MAGNESIA) 400 MG/5ML suspension Take 30 mLs by mouth daily as needed for ConstipationOTC      tamsulosin (FLOMAX) 0.4 MG capsule Take 1 capsule by mouth daily, Disp-30 capsule, R-3Normal      pantoprazole (PROTONIX) 40 MG tablet Take 1 tablet by mouth 2 times daily (before meals), Disp-30 tablet, R-3Normal      cyclobenzaprine (FLEXERIL) 5 MG tablet Take 1 tablet by mouth 3 times daily as needed for Muscle spasms, Disp-15 tablet, R-0Normal      tiotropium (SPIRIVA) 18 MCG inhalation capsule Inhale 18 mcg into the lungs dailyHistorical Med      benzonatate (TESSALON PERLES) 100 MG capsule Take 100 mg by mouth 3 times daily as needed for CoughHistorical Med      therapeutic multivitamin-minerals (THERAGRAN-M) tablet Take 1 tablet by mouth daily. albuterol (PROVENTIL HFA;VENTOLIN HFA) 108 (90 BASE) MCG/ACT inhaler Inhale 2 puffs into the lungs every 6 hours as needed. aspirin 325 MG tablet Take 325 mg by mouth daily. Historical Med             Time Spent on discharge is more than 30 minutes in the examination, evaluation, counseling and review of medications and discharge plan. Signed:    Kevin Young MD   7/31/2018      Thank you Zaynab Alan for the opportunity to be involved in this patient's care. If you have any questions or concerns please feel free to contact me at [phone].

## 2018-07-31 NOTE — PROGRESS NOTES
Physical Therapy  Facility/Department: [facility] C4 PCU  Daily Treatment Note  NAME: Chet Browning  : 1934  MRN: [id number]    Date of Service: 2018    Discharge Recommendations:  Subacute/Skilled Nursing Facility   PT Equipment Recommendations  Equipment Needed: No    Patient Diagnosis(es): The primary encounter diagnosis was COPD exacerbation ([facility] Diagnoses of Hypoxia, Pneumonia of left lower lobe due to infectious organism [facility]), Aneurysm ([facility] and Cancer ([facility] were also pertinent to this visit. has a past medical history of Aneurysm ([facility] CAD (coronary artery disease); Cancer ([facility] Cancer ([facility] Chronic diastolic CHF (grade 2 LVDD); COPD (chronic obstructive pulmonary disease) ([facility] Heart murmur; Hypercholesteremia; Hypertension; and Influenza A.   has a past surgical history that includes Abdominal aortic aneurysm repair; Cholecystectomy, laparoscopic (N/A, 2018); and Upper gastrointestinal endoscopy (2018). Restrictions  Restrictions/Precautions: General Precautions  Position Activity Restriction  Other position/activity restrictions: medium fall risk (no bed or chair alarm per RN), 4LO2  Subjective   Chart Reviewed: Yes  Response To Previous Treatment: Patient with no complaints from previous session. Family / Caregiver Present: Yes (wfe)  Referring Practitioner: Brit Duke MD  Subjective: Pt supine in bed, pleasant and agreeable. Comments: RN cleared pt to participate.  4 L o2   Pain Screening  Patient Currently in Pain: Denies       Orientation  Overall Orientation Status: Within Normal Limits  Objective   Bed mobility  Supine to Sit: Modified independent  Transfers  Sit to Stand: Stand by assistance  Stand to sit: Stand by assistance  Ambulation  Surface: level tile  Device: No Device  Assistance: Contact guard assistance  Quality of Gait: slow pace, shortened stride  Distance: 25 ft x 4 with seated rests to regain SpO2  Exercises  Hip Flexion: 12 x B seated  Sit to and from stand 5 x in succession, performed slowly  Knee Long Arc Quad: 12 x B  Ankle Pumps: Standing B heel raise 10 x holding walker (pt took several standing rests during this ex)  Comments: Desats to 83% with standing activity, 88% with standing activity. Needs rests between activities to regain >90% SpO2     Assessment   Body structures, Functions, Activity limitations: Decreased functional mobility ; Decreased balance;Decreased strength;Decreased endurance  Assessment: Slowly improving activity tolerance. Pt and wife voice understanding of energy conservation, pacing activities, resting between activities. Persistant desat with standing activities to 83%, rebounds within 1 minute of rest and focused breathing. Discussed with pt and wife recommendation for SNF as safest option due to desat with activity, They voice understanding but prefer home with home PT  Treatment Diagnosis: decreased funcitonal mobility  Specific instructions for Next Treatment: paced ex, transfers, gait  Prognosis: Good  Patient Education: role of PT, use of call light, safe transfers/gait, use of AD, poc, d/c rec, PLB  Barriers to Learning: pt and wife voice understanding  REQUIRES PT FOLLOW UP: Yes  Activity Tolerance  Activity Tolerance: Patient Tolerated treatment well;Patient limited by endurance  Activity Tolerance: Desats to 83% wtih standing activity, 88% with seated activity . BP 94/60 post amb.  HR varied 92 to 106 during activity     G-Code  PT G-Codes  Functional Assessment Tool Used: AM PAC  Score: 19    AM-PAC Score  AM-PAC Inpatient Mobility Raw Score : 19  AM-PAC Inpatient T-Scale Score : 45.44  Mobility Inpatient CMS 0-100% Score: 41.77  Mobility Inpatient CMS G-Code Modifier : CK          Goals  Short term goals  Time Frame for Short term goals: 1 week  Short term goal 1: pt will complete bed mobility with ind. - met 7/31  Short term goal 2: pt will complete functional transfers with supv. - met 7/31  Short term goal

## 2018-07-31 NOTE — PLAN OF CARE
Problem: Falls - Risk of:  Goal: Will remain free from falls  Will remain free from falls   Outcome: Met This Shift  Pt is free of falls at this time. Bed is in the lowest position, wheels locked, side rails up x 2, call light, and personal belongings within reach. Will continue to monitor. Problem: OXYGENATION/RESPIRATORY FUNCTION  Goal: Patient will maintain patent airway  Outcome: Met This Shift  Patient's EF (Ejection Fraction) is greater than 40%    Patient has a past medical history of Aneurysm ([facility] CAD (coronary artery disease); Cancer ([facility] Chronic diastolic CHF (grade 2 LVDD); COPD (chronic obstructive pulmonary disease) ([facility] Heart murmur; Hypercholesteremia; Hypertension; and Influenza A. Comorbidities reviewed and education provided. Patient and family's stated goal of care: reduce shortness of breath prior to discharge    Patient's current functional capacity:  Slight limitation of physical activity. Comfortable at rest. Ordinary physical activity results in fatigue, palpitation, dyspnea. Pt resting in bed at this time on 4 L O2. Pt denies shortness of breath. Pt without lower extremity edema. Patient's weights and intake/output reviewed:    Patient Vitals for the past 96 hrs (Last 3 readings):   Weight   07/30/18 0542 136 lb 11.2 oz (62 kg)   07/29/18 0459 138 lb 6.4 oz (62.8 kg)   07/28/18 0444 137 lb 12.6 oz (62.5 kg)       Intake/Output Summary (Last 24 hours) at 07/31/18 0350  Last data filed at 07/31/18 0152   Gross per 24 hour   Intake              960 ml   Output             2850 ml   Net            -1890 ml       Patient provided with education on CHF signs/symptoms, causes, discharge medications, daily weights, low sodium diet, activity, and follow-up. Notified patient to call the doctor post discharge if patient experiences shortness of breath, chest pain, swelling, cough, or weight gain of three pounds in a day/five pounds in a week.  Also notified patient to call

## 2018-07-31 NOTE — PROGRESS NOTES
assistance  Stand to sit: Modified independent  Comment: decreased BP with standing  Bed mobility  Supine to Sit: Unable to assess (already up in chair)  Sit to Supine: Unable to assess (Left up in chair for lunch upon exiting)  Transfers  Sit to stand: Stand by assistance  Stand to sit: Modified independent      Cognition  Overall Cognitive Status: WFL    Type of ROM/Therapeutic Exercise: AROM BUE  Hand flex/ext: x  15  Reps  Wrist flex/ext:  X  15 Reps  Elbow flex/ext:  x 15   Reps  Forearm sup/pron:  x 15   Reps  Shld flex/ext:  x   15 Reps    LUE AROM : WFL  RUE AROM : WFL       Assessment   Performance deficits / Impairments: Decreased functional mobility ; Decreased ADL status; Decreased endurance;Decreased high-level IADLs  REQUIRES OT FOLLOW UP: Yes  Activity Tolerance  Activity Tolerance: Patient limited by fatigue  Activity Tolerance: sitting in chair on 4 L O 2:  BP = 89/59, 92 % O sats, HR = 93; standing to use urinal:  BP = 77/55; sitting in chair after standing 2 minutes:  BP = 99/65, HR = 89  Safety Devices  Safety Devices in place: Yes  Type of devices: Chair alarm in place;Nurse notified; Left in chair;Call light within reach          Plan   Plan  Times per week: 3-5x/week   Current Treatment Recommendations: Self-Care / ADL, Functional Mobility Training, Endurance Training, Safety Education & Training  G-Code  OT G-codes  Functional Assessment Tool Used: AM-PAC  Score: raw score = 19; G-code = CK  Functional Limitation: Self care  Self Care Current Status (): At least 40 percent but less than 60 percent impaired, limited or restricted  Self Care Goal Status ():  At least 20 percent but less than 40 percent impaired, limited or restricted    AM-PAC Score        AM-[facility] Inpatient Daily Activity Raw Score: 19  AM-PAC Inpatient ADL T-Scale Score : 40.22  ADL Inpatient CMS 0-100% Score: 42.8  ADL Inpatient CMS G-Code Modifier : CK    Goals  Short term goals  Time Frame for Short term goals: for 1 week  Short term goal 1: Perform LE dressing with min A by 8/6; STG met 7-31-18  Short term goal 2: Perform 2 grooming tasks standing at sink with SBA   Short term goal 3: Perform UE exer 10-15x each  Short term goal 4: Perform bathroom mobility with SBA with RW  Patient Goals   Patient goals :  \"Get stronger\"       Therapy Time   Individual Concurrent Group Co-treatment   Time In 1115         Time Out 1146         Minutes [address] [place], [place]

## 2018-08-01 LAB
EKG ATRIAL RATE: 99 BPM
EKG DIAGNOSIS: NORMAL
EKG P AXIS: 60 DEGREES
EKG P-R INTERVAL: 178 MS
EKG Q-T INTERVAL: 326 MS
EKG QRS DURATION: 88 MS
EKG QTC CALCULATION (BAZETT): 418 MS
EKG R AXIS: 202 DEGREES
EKG T AXIS: 59 DEGREES
EKG VENTRICULAR RATE: 99 BPM

## 2018-08-03 ENCOUNTER — TELEPHONE (OUTPATIENT)
Dept: INPATIENT UNIT | Age: 83
End: 2018-08-03

## 2018-08-03 NOTE — TELEPHONE ENCOUNTER
Hospital FU phone call placed to patient and spoke with patient's wife who was very short on the phone. Patient's wife states patient was unavailable at this time as he was just waking up. Writer offered to call back later today or at a different time but patient's wife declined stating they have company coming later today. Patient's wife declined medication review at this time. Briefly reviewed diet instructions and recommended level of activity. Notified patient's wife to call the doctor post discharge if he experiences shortness of breath, chest pain, swelling, cough, or weight gain or loss of 2-3 pounds in a day/5 pounds in a week.    ---------------    Call placed to [facility] to confirm patient has been seen in the home since discharge. Writer confirmed with [facility] staff member Piyush Dial that patient was seen by the RN on 8/1 and therapy on 8/2. Patient is scheduled to be seen again by therapy on 8/6 and the RN on 8/7.

## 2018-10-10 ENCOUNTER — OFFICE VISIT (OUTPATIENT)
Dept: ORTHOPEDIC SURGERY | Age: 83
End: 2018-10-10
Payer: MEDICARE

## 2018-10-10 VITALS — HEIGHT: 68 IN | BODY MASS INDEX: 20.85 KG/M2 | WEIGHT: 137.57 LBS

## 2018-10-10 DIAGNOSIS — M54.50 LUMBAR SPINE PAIN: Primary | ICD-10-CM

## 2018-10-10 PROCEDURE — G8484 FLU IMMUNIZE NO ADMIN: HCPCS | Performed by: PHYSICIAN ASSISTANT

## 2018-10-10 PROCEDURE — 4040F PNEUMOC VAC/ADMIN/RCVD: CPT | Performed by: PHYSICIAN ASSISTANT

## 2018-10-10 PROCEDURE — G8420 CALC BMI NORM PARAMETERS: HCPCS | Performed by: PHYSICIAN ASSISTANT

## 2018-10-10 PROCEDURE — 1036F TOBACCO NON-USER: CPT | Performed by: PHYSICIAN ASSISTANT

## 2018-10-10 PROCEDURE — 1123F ACP DISCUSS/DSCN MKR DOCD: CPT | Performed by: PHYSICIAN ASSISTANT

## 2018-10-10 PROCEDURE — 1101F PT FALLS ASSESS-DOCD LE1/YR: CPT | Performed by: PHYSICIAN ASSISTANT

## 2018-10-10 PROCEDURE — 99213 OFFICE O/P EST LOW 20 MIN: CPT | Performed by: PHYSICIAN ASSISTANT

## 2018-10-10 PROCEDURE — G8427 DOCREV CUR MEDS BY ELIG CLIN: HCPCS | Performed by: PHYSICIAN ASSISTANT

## 2018-10-10 NOTE — PROGRESS NOTES
Subjective    Chief Complaint   Patient presents with    Back Pain     LEFT LBP        Flavia Reese is a 80 y.o. male who presents today for evaluation of low back pain. he describes the pain as aching and throbbing and it is intermittent. he does not remember a specific injury that started the pain. sitting and standing makes the pain worse. rest  makes the pain better. The pain does not radiate to the lower extremities. he denies changes in bowel or bladder habits. Pt states that he has had this pain for the last year or so. Describes it as pain in the low back around the PSIS and has been chronic in nature. Patient's medications, allergies, past medical, surgical, social and family histories were reviewed and updated as appropriate. The pain assessment was noted & is as follows:  Pain Assessment  Location of Pain: Back  Location Modifiers: Left  Severity of Pain: 3  Quality of Pain: Aching  Duration of Pain: Persistent  Frequency of Pain: Constant    Physical Exam  Constitutional:  Pt well groomed, no acute distress, well developed, no obvious deformities  Vitals:    10/10/18 1654   Weight: 137 lb 9.1 oz (62.4 kg)   Height: 5' 7.99\" (1.727 m)     -Oriented to person, place, and time  -mood and affect are appropriate    Lumbar Exam:  he  is well-developed, well-nourished, oriented to person, place, and time. his  mood and affect are appropriate.     -his gait is Normal.    -Motor strength is 5/5 throughout both lower extremities. -Lumbar range of motion is limited in flexion and extension secondary to pain. -he is Tender to palpation over the lower lumbar vertebrae. -There is decrease of the normal lumbar lordosis secondary to pain. Neurological:  -Deep tendon reflexes at elbow and wrist are symmetric bilaterally.    -Blurry, double visionis not noted. -NVI to lower extremities bilaterally.      Skin:  No abrasions, lesions, cellulitic changes, obvious deformities noted     Xray:   XR

## 2018-10-18 ENCOUNTER — OFFICE VISIT (OUTPATIENT)
Dept: ORTHOPEDIC SURGERY | Age: 83
End: 2018-10-18
Payer: MEDICARE

## 2018-10-18 VITALS
BODY MASS INDEX: 20.85 KG/M2 | DIASTOLIC BLOOD PRESSURE: 82 MMHG | SYSTOLIC BLOOD PRESSURE: 123 MMHG | WEIGHT: 137.57 LBS | HEIGHT: 68 IN | HEART RATE: 76 BPM

## 2018-10-18 DIAGNOSIS — M70.62 TROCHANTERIC BURSITIS OF BOTH HIPS: Primary | ICD-10-CM

## 2018-10-18 DIAGNOSIS — M70.61 TROCHANTERIC BURSITIS OF BOTH HIPS: Primary | ICD-10-CM

## 2018-10-18 PROCEDURE — G8484 FLU IMMUNIZE NO ADMIN: HCPCS | Performed by: ORTHOPAEDIC SURGERY

## 2018-10-18 PROCEDURE — 4040F PNEUMOC VAC/ADMIN/RCVD: CPT | Performed by: ORTHOPAEDIC SURGERY

## 2018-10-18 PROCEDURE — 99213 OFFICE O/P EST LOW 20 MIN: CPT | Performed by: ORTHOPAEDIC SURGERY

## 2018-10-18 PROCEDURE — 1123F ACP DISCUSS/DSCN MKR DOCD: CPT | Performed by: ORTHOPAEDIC SURGERY

## 2018-10-18 PROCEDURE — 1036F TOBACCO NON-USER: CPT | Performed by: ORTHOPAEDIC SURGERY

## 2018-10-18 PROCEDURE — 1101F PT FALLS ASSESS-DOCD LE1/YR: CPT | Performed by: ORTHOPAEDIC SURGERY

## 2018-10-18 PROCEDURE — G8420 CALC BMI NORM PARAMETERS: HCPCS | Performed by: ORTHOPAEDIC SURGERY

## 2018-10-18 PROCEDURE — G8427 DOCREV CUR MEDS BY ELIG CLIN: HCPCS | Performed by: ORTHOPAEDIC SURGERY

## 2018-10-30 ENCOUNTER — TELEPHONE (OUTPATIENT)
Dept: ORTHOPEDIC SURGERY | Age: 83
End: 2018-10-30

## 2018-10-30 ENCOUNTER — OFFICE VISIT (OUTPATIENT)
Dept: ORTHOPEDIC SURGERY | Age: 83
End: 2018-10-30
Payer: MEDICARE

## 2018-10-30 VITALS
WEIGHT: 137.57 LBS | HEART RATE: 57 BPM | HEIGHT: 68 IN | BODY MASS INDEX: 20.85 KG/M2 | DIASTOLIC BLOOD PRESSURE: 57 MMHG | SYSTOLIC BLOOD PRESSURE: 117 MMHG

## 2018-10-30 DIAGNOSIS — S32.040A CLOSED COMPRESSION FRACTURE OF FOURTH LUMBAR VERTEBRA, INITIAL ENCOUNTER: Primary | ICD-10-CM

## 2018-10-30 DIAGNOSIS — M51.36 DDD (DEGENERATIVE DISC DISEASE), LUMBAR: ICD-10-CM

## 2018-10-30 DIAGNOSIS — M54.16 LUMBAR RADICULITIS: ICD-10-CM

## 2018-10-30 PROCEDURE — 1123F ACP DISCUSS/DSCN MKR DOCD: CPT | Performed by: PHYSICIAN ASSISTANT

## 2018-10-30 PROCEDURE — 1036F TOBACCO NON-USER: CPT | Performed by: PHYSICIAN ASSISTANT

## 2018-10-30 PROCEDURE — G8420 CALC BMI NORM PARAMETERS: HCPCS | Performed by: PHYSICIAN ASSISTANT

## 2018-10-30 PROCEDURE — 4040F PNEUMOC VAC/ADMIN/RCVD: CPT | Performed by: PHYSICIAN ASSISTANT

## 2018-10-30 PROCEDURE — G8484 FLU IMMUNIZE NO ADMIN: HCPCS | Performed by: PHYSICIAN ASSISTANT

## 2018-10-30 PROCEDURE — 1101F PT FALLS ASSESS-DOCD LE1/YR: CPT | Performed by: PHYSICIAN ASSISTANT

## 2018-10-30 PROCEDURE — G8427 DOCREV CUR MEDS BY ELIG CLIN: HCPCS | Performed by: PHYSICIAN ASSISTANT

## 2018-10-30 PROCEDURE — 99214 OFFICE O/P EST MOD 30 MIN: CPT | Performed by: PHYSICIAN ASSISTANT

## 2018-10-30 NOTE — PROGRESS NOTES
every 6 hours as needed. , Disp: , Rfl:     aspirin 325 MG tablet, Take 325 mg by mouth daily. , Disp: , Rfl:     furosemide (LASIX) 20 MG tablet, Take 1 tablet by mouth daily, Disp: 30 tablet, Rfl: 0  Allergies:  Patient has no known allergies. Social History:    reports that he quit smoking about 37 years ago. He has never used smokeless tobacco. He reports that he does not drink alcohol or use drugs. Family History:   Family History   Problem Relation Age of Onset    No Known Problems Mother     No Known Problems Father        REVIEW OF SYSTEMS: Full ROS noted & scanned   CONSTITUTIONAL: Denies unexplained weight loss, fevers, chills or fatigue  NEUROLOGICAL: Denies unsteady gait or progressive weakness  MUSCULOSKELETAL: admits joint swelling or redness  PSYCHOLOGICAL: Denies anxiety, depression   SKIN: Denies skin changes, delayed healing, rash, itching   HEMATOLOGIC: Denies easy bleeding admit easy bruising  ENDOCRINE: Denies excessive thirst, urination, heat/cold  RESPIRATORY: Denies current dyspnea, cough  GI: Denies nausea, vomiting, diarrhea   : Denies bowel or bladder issues       PHYSICAL EXAM:    Vitals: Blood pressure (!) 117/57, pulse 57, height 5' 7.72\" (1.72 m), weight 137 lb 9.1 oz (62.4 kg). GENERAL EXAM:  · General Apparence: Patient is adequately groomed with no evidence of malnutrition. · Orientation: The patient is oriented to time, place and person. · Mood & Affect:The patient's mood and affect are appropriate   · Vascular: Examination reveals no swelling tenderness in upper or lower extremities. Good capillary refill  · Lymphatic: The lymphatic examination bilaterally reveals all areas to be without enlargement or induration  · Sensation: Sensation is intact without deficit    LUMBAR/SACRAL EXAMINATION:  · Inspection: Local inspection shows no step-off or bruising. Kyphosis. Mild gibbus deformity. · Palpation:  Nontender to palpation or percussion.   No evidence of tenderness at

## 2018-10-31 ENCOUNTER — HOSPITAL ENCOUNTER (OUTPATIENT)
Dept: CARDIAC REHAB | Age: 83
Setting detail: THERAPIES SERIES
Discharge: HOME | End: 2018-10-31
Payer: OTHER GOVERNMENT

## 2018-11-05 ENCOUNTER — HOSPITAL ENCOUNTER (OUTPATIENT)
Dept: CARDIAC REHAB | Age: 83
Setting detail: THERAPIES SERIES
Discharge: HOME | End: 2018-11-05
Payer: OTHER GOVERNMENT

## 2018-11-05 PROCEDURE — G0239 OTH RESP PROC, GROUP: HCPCS

## 2018-11-07 ENCOUNTER — HOSPITAL ENCOUNTER (OUTPATIENT)
Dept: CARDIAC REHAB | Age: 83
Setting detail: THERAPIES SERIES
Discharge: HOME | End: 2018-11-07
Payer: OTHER GOVERNMENT

## 2018-11-07 PROCEDURE — G0239 OTH RESP PROC, GROUP: HCPCS

## 2018-11-09 ENCOUNTER — HOSPITAL ENCOUNTER (OUTPATIENT)
Dept: CARDIAC REHAB | Age: 83
Setting detail: THERAPIES SERIES
Discharge: HOME | End: 2018-11-09
Payer: OTHER GOVERNMENT

## 2018-11-09 PROCEDURE — G0239 OTH RESP PROC, GROUP: HCPCS

## 2018-11-12 ENCOUNTER — HOSPITAL ENCOUNTER (OUTPATIENT)
Dept: CARDIAC REHAB | Age: 83
Setting detail: THERAPIES SERIES
Discharge: HOME | End: 2018-11-12
Payer: OTHER GOVERNMENT

## 2018-11-12 PROCEDURE — G0239 OTH RESP PROC, GROUP: HCPCS

## 2018-11-14 ENCOUNTER — HOSPITAL ENCOUNTER (OUTPATIENT)
Dept: CARDIAC REHAB | Age: 83
Setting detail: THERAPIES SERIES
Discharge: HOME | End: 2018-11-14
Payer: OTHER GOVERNMENT

## 2018-11-14 PROCEDURE — G0239 OTH RESP PROC, GROUP: HCPCS

## 2018-11-19 ENCOUNTER — HOSPITAL ENCOUNTER (OUTPATIENT)
Dept: CARDIAC REHAB | Age: 83
Setting detail: THERAPIES SERIES
Discharge: HOME | End: 2018-11-19
Payer: OTHER GOVERNMENT

## 2018-11-19 PROCEDURE — G0239 OTH RESP PROC, GROUP: HCPCS

## 2018-11-26 ENCOUNTER — HOSPITAL ENCOUNTER (OUTPATIENT)
Dept: CARDIAC REHAB | Age: 83
Setting detail: THERAPIES SERIES
Discharge: HOME | End: 2018-11-26
Payer: OTHER GOVERNMENT

## 2018-11-26 PROCEDURE — G0239 OTH RESP PROC, GROUP: HCPCS

## 2018-11-28 ENCOUNTER — HOSPITAL ENCOUNTER (OUTPATIENT)
Dept: CARDIAC REHAB | Age: 83
Setting detail: THERAPIES SERIES
Discharge: HOME | End: 2018-11-28
Payer: OTHER GOVERNMENT

## 2018-11-28 PROCEDURE — G0239 OTH RESP PROC, GROUP: HCPCS

## 2018-11-29 ENCOUNTER — OFFICE VISIT (OUTPATIENT)
Dept: ORTHOPEDIC SURGERY | Age: 83
End: 2018-11-29
Payer: MEDICARE

## 2018-11-29 VITALS
WEIGHT: 137.57 LBS | HEART RATE: 67 BPM | DIASTOLIC BLOOD PRESSURE: 74 MMHG | SYSTOLIC BLOOD PRESSURE: 114 MMHG | BODY MASS INDEX: 20.85 KG/M2 | HEIGHT: 68 IN

## 2018-11-29 DIAGNOSIS — M54.16 LUMBAR RADICULITIS: ICD-10-CM

## 2018-11-29 DIAGNOSIS — M48.062 LUMBAR STENOSIS WITH NEUROGENIC CLAUDICATION: ICD-10-CM

## 2018-11-29 DIAGNOSIS — S32.040D CLOSED COMPRESSION FRACTURE OF L4 LUMBAR VERTEBRA WITH ROUTINE HEALING, SUBSEQUENT ENCOUNTER: Primary | ICD-10-CM

## 2018-11-29 PROCEDURE — G8427 DOCREV CUR MEDS BY ELIG CLIN: HCPCS | Performed by: PHYSICIAN ASSISTANT

## 2018-11-29 PROCEDURE — G8420 CALC BMI NORM PARAMETERS: HCPCS | Performed by: PHYSICIAN ASSISTANT

## 2018-11-29 PROCEDURE — 4040F PNEUMOC VAC/ADMIN/RCVD: CPT | Performed by: PHYSICIAN ASSISTANT

## 2018-11-29 PROCEDURE — G8484 FLU IMMUNIZE NO ADMIN: HCPCS | Performed by: PHYSICIAN ASSISTANT

## 2018-11-29 PROCEDURE — 1036F TOBACCO NON-USER: CPT | Performed by: PHYSICIAN ASSISTANT

## 2018-11-29 PROCEDURE — 1123F ACP DISCUSS/DSCN MKR DOCD: CPT | Performed by: PHYSICIAN ASSISTANT

## 2018-11-29 PROCEDURE — 99214 OFFICE O/P EST MOD 30 MIN: CPT | Performed by: PHYSICIAN ASSISTANT

## 2018-11-29 PROCEDURE — 1101F PT FALLS ASSESS-DOCD LE1/YR: CPT | Performed by: PHYSICIAN ASSISTANT

## 2018-11-29 NOTE — PROGRESS NOTES
Hypercholesteremia     Hypertension     Influenza A 12/7/14      Past Surgical History:     Past Surgical History:   Procedure Laterality Date    ABDOMINAL AORTIC ANEURYSM REPAIR      CHOLECYSTECTOMY, LAPAROSCOPIC N/A 04/03/2018    UPPER GASTROINTESTINAL ENDOSCOPY  06/23/2018    small tear seen in esophagus     Current Medications:     Current Outpatient Prescriptions:     Cholecalciferol (VITAMIN D3) 44909 units CAPS, Take 1 capsule by mouth every 30 days, Disp: , Rfl:     nystatin (MYCOSTATIN) 312019 UNIT/ML suspension, Take 10 mLs by mouth 3 times daily, Disp: , Rfl:     oxyCODONE-acetaminophen (PERCOCET) 5-325 MG per tablet, Take 1 tablet by mouth every 8 hours as needed for Pain. ., Disp: , Rfl:     atorvastatin (LIPITOR) 10 MG tablet, Take 10 mg by mouth daily, Disp: , Rfl:     fluticasone-vilanterol (BREO ELLIPTA) 100-25 MCG/INH AEPB inhaler, Inhale 1 puff into the lungs daily, Disp: , Rfl:     docusate sodium (COLACE, DULCOLAX) 100 MG CAPS, Take 100 mg by mouth 2 times daily, Disp: 30 capsule, Rfl: 3    magnesium hydroxide (MILK OF MAGNESIA) 400 MG/5ML suspension, Take 30 mLs by mouth daily as needed for Constipation, Disp: , Rfl:     tamsulosin (FLOMAX) 0.4 MG capsule, Take 1 capsule by mouth daily, Disp: 30 capsule, Rfl: 3    pantoprazole (PROTONIX) 40 MG tablet, Take 1 tablet by mouth 2 times daily (before meals), Disp: 30 tablet, Rfl: 3    cyclobenzaprine (FLEXERIL) 5 MG tablet, Take 1 tablet by mouth 3 times daily as needed for Muscle spasms, Disp: 15 tablet, Rfl: 0    tiotropium (SPIRIVA) 18 MCG inhalation capsule, Inhale 18 mcg into the lungs daily, Disp: , Rfl:     benzonatate (TESSALON PERLES) 100 MG capsule, Take 100 mg by mouth 3 times daily as needed for Cough, Disp: , Rfl:     therapeutic multivitamin-minerals (THERAGRAN-M) tablet, Take 1 tablet by mouth daily. , Disp: , Rfl:     albuterol (PROVENTIL HFA;VENTOLIN HFA) 108 (90 BASE) MCG/ACT inhaler, Inhale 2 puffs into the lungs

## 2018-11-30 ENCOUNTER — HOSPITAL ENCOUNTER (OUTPATIENT)
Dept: CARDIAC REHAB | Age: 83
Setting detail: THERAPIES SERIES
Discharge: HOME | End: 2018-11-30
Payer: OTHER GOVERNMENT

## 2018-11-30 PROCEDURE — G0239 OTH RESP PROC, GROUP: HCPCS

## 2018-12-03 ENCOUNTER — TELEPHONE (OUTPATIENT)
Dept: ORTHOPEDIC SURGERY | Age: 83
End: 2018-12-03

## 2018-12-07 ENCOUNTER — HOSPITAL ENCOUNTER (OUTPATIENT)
Dept: CARDIAC REHAB | Age: 83
Setting detail: THERAPIES SERIES
Discharge: HOME | End: 2018-12-07
Payer: OTHER GOVERNMENT

## 2018-12-07 PROCEDURE — G0239 OTH RESP PROC, GROUP: HCPCS

## 2018-12-10 ENCOUNTER — HOSPITAL ENCOUNTER (OUTPATIENT)
Dept: CARDIAC REHAB | Age: 83
Setting detail: THERAPIES SERIES
Discharge: HOME | End: 2018-12-10
Payer: OTHER GOVERNMENT

## 2018-12-10 PROCEDURE — G0239 OTH RESP PROC, GROUP: HCPCS

## 2018-12-18 ENCOUNTER — HOSPITAL ENCOUNTER (OUTPATIENT)
Age: 83
Setting detail: OUTPATIENT SURGERY
Discharge: HOME | End: 2018-12-18
Attending: PHYSICAL MEDICINE & REHABILITATION | Admitting: PHYSICAL MEDICINE & REHABILITATION
Payer: OTHER GOVERNMENT

## 2018-12-18 VITALS
OXYGEN SATURATION: 95 % | HEART RATE: 69 BPM | BODY MASS INDEX: 22.13 KG/M2 | WEIGHT: 146 LBS | DIASTOLIC BLOOD PRESSURE: 98 MMHG | RESPIRATION RATE: 12 BRPM | HEIGHT: 68 IN | SYSTOLIC BLOOD PRESSURE: 161 MMHG | TEMPERATURE: 97 F

## 2018-12-18 PROCEDURE — 6360000004 HC RX CONTRAST MEDICATION: Performed by: PHYSICAL MEDICINE & REHABILITATION

## 2018-12-18 PROCEDURE — 3600000002 HC SURGERY LEVEL 2 BASE: Performed by: PHYSICAL MEDICINE & REHABILITATION

## 2018-12-18 PROCEDURE — 2500000003 HC RX 250 WO HCPCS: Performed by: PHYSICAL MEDICINE & REHABILITATION

## 2018-12-18 PROCEDURE — 7100000011 HC PHASE II RECOVERY - ADDTL 15 MIN: Performed by: PHYSICAL MEDICINE & REHABILITATION

## 2018-12-18 PROCEDURE — 2709999900 HC NON-CHARGEABLE SUPPLY: Performed by: PHYSICAL MEDICINE & REHABILITATION

## 2018-12-18 PROCEDURE — 7100000010 HC PHASE II RECOVERY - FIRST 15 MIN: Performed by: PHYSICAL MEDICINE & REHABILITATION

## 2018-12-18 PROCEDURE — 6360000002 HC RX W HCPCS: Performed by: PHYSICAL MEDICINE & REHABILITATION

## 2018-12-18 ASSESSMENT — PAIN DESCRIPTION - DESCRIPTORS: DESCRIPTORS: ACHING

## 2018-12-18 ASSESSMENT — PAIN - FUNCTIONAL ASSESSMENT: PAIN_FUNCTIONAL_ASSESSMENT: 0-10

## 2018-12-18 ASSESSMENT — PAIN SCALES - GENERAL: PAINLEVEL_OUTOF10: 0

## 2018-12-18 ASSESSMENT — PAIN DESCRIPTION - PAIN TYPE: TYPE: CHRONIC PAIN

## 2018-12-18 ASSESSMENT — PAIN DESCRIPTION - LOCATION: LOCATION: BACK

## 2018-12-19 ENCOUNTER — HOSPITAL ENCOUNTER (OUTPATIENT)
Dept: CARDIAC REHAB | Age: 83
Setting detail: THERAPIES SERIES
Discharge: HOME | End: 2018-12-19
Payer: OTHER GOVERNMENT

## 2018-12-19 PROCEDURE — G0239 OTH RESP PROC, GROUP: HCPCS

## 2018-12-26 ENCOUNTER — HOSPITAL ENCOUNTER (OUTPATIENT)
Dept: CARDIAC REHAB | Age: 83
Setting detail: THERAPIES SERIES
Discharge: HOME | End: 2018-12-26
Payer: OTHER GOVERNMENT

## 2018-12-26 PROCEDURE — G0239 OTH RESP PROC, GROUP: HCPCS

## 2019-01-03 ENCOUNTER — OFFICE VISIT (OUTPATIENT)
Dept: ORTHOPEDIC SURGERY | Age: 84
End: 2019-01-03
Payer: MEDICARE

## 2019-01-03 VITALS
HEIGHT: 68 IN | HEART RATE: 96 BPM | DIASTOLIC BLOOD PRESSURE: 88 MMHG | SYSTOLIC BLOOD PRESSURE: 138 MMHG | BODY MASS INDEX: 22.12 KG/M2 | WEIGHT: 145.94 LBS

## 2019-01-03 DIAGNOSIS — M48.062 LUMBAR STENOSIS WITH NEUROGENIC CLAUDICATION: ICD-10-CM

## 2019-01-03 DIAGNOSIS — S32.040D CLOSED COMPRESSION FRACTURE OF L4 LUMBAR VERTEBRA WITH ROUTINE HEALING, SUBSEQUENT ENCOUNTER: Primary | ICD-10-CM

## 2019-01-03 PROCEDURE — 1036F TOBACCO NON-USER: CPT | Performed by: PHYSICIAN ASSISTANT

## 2019-01-03 PROCEDURE — G8420 CALC BMI NORM PARAMETERS: HCPCS | Performed by: PHYSICIAN ASSISTANT

## 2019-01-03 PROCEDURE — G8427 DOCREV CUR MEDS BY ELIG CLIN: HCPCS | Performed by: PHYSICIAN ASSISTANT

## 2019-01-03 PROCEDURE — 99214 OFFICE O/P EST MOD 30 MIN: CPT | Performed by: PHYSICIAN ASSISTANT

## 2019-01-03 PROCEDURE — 4040F PNEUMOC VAC/ADMIN/RCVD: CPT | Performed by: PHYSICIAN ASSISTANT

## 2019-01-03 PROCEDURE — 1101F PT FALLS ASSESS-DOCD LE1/YR: CPT | Performed by: PHYSICIAN ASSISTANT

## 2019-01-03 PROCEDURE — G8484 FLU IMMUNIZE NO ADMIN: HCPCS | Performed by: PHYSICIAN ASSISTANT

## 2019-01-03 PROCEDURE — 1123F ACP DISCUSS/DSCN MKR DOCD: CPT | Performed by: PHYSICIAN ASSISTANT

## 2019-01-04 ENCOUNTER — HOSPITAL ENCOUNTER (OUTPATIENT)
Dept: CARDIAC REHAB | Age: 84
Setting detail: THERAPIES SERIES
Discharge: HOME | End: 2019-01-04
Payer: OTHER GOVERNMENT

## 2019-01-04 PROCEDURE — G0239 OTH RESP PROC, GROUP: HCPCS

## 2019-01-07 ENCOUNTER — HOSPITAL ENCOUNTER (OUTPATIENT)
Dept: CARDIAC REHAB | Age: 84
Setting detail: THERAPIES SERIES
Discharge: HOME | End: 2019-01-07
Payer: OTHER GOVERNMENT

## 2019-01-07 PROCEDURE — G0239 OTH RESP PROC, GROUP: HCPCS

## 2019-01-11 ENCOUNTER — HOSPITAL ENCOUNTER (OUTPATIENT)
Dept: CARDIAC REHAB | Age: 84
Setting detail: THERAPIES SERIES
Discharge: HOME | End: 2019-01-11
Payer: OTHER GOVERNMENT

## 2019-01-11 PROCEDURE — G0239 OTH RESP PROC, GROUP: HCPCS

## 2019-01-14 ENCOUNTER — APPOINTMENT (OUTPATIENT)
Dept: CARDIAC REHAB | Age: 84
End: 2019-01-14
Payer: OTHER GOVERNMENT

## 2019-01-16 ENCOUNTER — APPOINTMENT (OUTPATIENT)
Dept: CARDIAC REHAB | Age: 84
End: 2019-01-16
Payer: OTHER GOVERNMENT

## 2019-01-18 ENCOUNTER — APPOINTMENT (OUTPATIENT)
Dept: CARDIAC REHAB | Age: 84
End: 2019-01-18
Payer: OTHER GOVERNMENT

## 2019-01-21 ENCOUNTER — APPOINTMENT (OUTPATIENT)
Dept: CARDIAC REHAB | Age: 84
End: 2019-01-21
Payer: OTHER GOVERNMENT

## 2019-01-23 ENCOUNTER — APPOINTMENT (OUTPATIENT)
Dept: CARDIAC REHAB | Age: 84
End: 2019-01-23
Payer: OTHER GOVERNMENT

## 2019-01-25 ENCOUNTER — APPOINTMENT (OUTPATIENT)
Dept: CARDIAC REHAB | Age: 84
End: 2019-01-25
Payer: OTHER GOVERNMENT

## 2019-01-28 ENCOUNTER — HOSPITAL ENCOUNTER (OUTPATIENT)
Dept: CARDIAC REHAB | Age: 84
Setting detail: THERAPIES SERIES
Discharge: HOME | End: 2019-01-28
Payer: OTHER GOVERNMENT

## 2019-01-30 ENCOUNTER — APPOINTMENT (OUTPATIENT)
Dept: CARDIAC REHAB | Age: 84
End: 2019-01-30
Payer: OTHER GOVERNMENT

## 2019-01-31 ENCOUNTER — TELEPHONE (OUTPATIENT)
Dept: ORTHOPEDIC SURGERY | Age: 84
End: 2019-01-31

## 2019-02-05 ENCOUNTER — HOSPITAL ENCOUNTER (OUTPATIENT)
Age: 84
Setting detail: OUTPATIENT SURGERY
Discharge: HOME | End: 2019-02-05
Attending: PHYSICAL MEDICINE & REHABILITATION | Admitting: PHYSICAL MEDICINE & REHABILITATION
Payer: COMMERCIAL

## 2019-02-05 VITALS
TEMPERATURE: 97 F | WEIGHT: 145 LBS | OXYGEN SATURATION: 97 % | BODY MASS INDEX: 22.05 KG/M2 | DIASTOLIC BLOOD PRESSURE: 86 MMHG | RESPIRATION RATE: 22 BRPM | HEART RATE: 88 BPM | SYSTOLIC BLOOD PRESSURE: 158 MMHG

## 2019-02-05 PROCEDURE — 3600000002 HC SURGERY LEVEL 2 BASE: Performed by: PHYSICAL MEDICINE & REHABILITATION

## 2019-02-05 PROCEDURE — 7100000011 HC PHASE II RECOVERY - ADDTL 15 MIN: Performed by: PHYSICAL MEDICINE & REHABILITATION

## 2019-02-05 PROCEDURE — 6360000004 HC RX CONTRAST MEDICATION: Performed by: PHYSICAL MEDICINE & REHABILITATION

## 2019-02-05 PROCEDURE — 2580000003 HC RX 258: Performed by: PHYSICAL MEDICINE & REHABILITATION

## 2019-02-05 PROCEDURE — 7100000010 HC PHASE II RECOVERY - FIRST 15 MIN: Performed by: PHYSICAL MEDICINE & REHABILITATION

## 2019-02-05 PROCEDURE — 2709999900 HC NON-CHARGEABLE SUPPLY: Performed by: PHYSICAL MEDICINE & REHABILITATION

## 2019-02-05 PROCEDURE — 6360000002 HC RX W HCPCS: Performed by: PHYSICAL MEDICINE & REHABILITATION

## 2019-02-05 RX ORDER — 0.9 % SODIUM CHLORIDE 0.9 %
VIAL (ML) INJECTION PRN
Status: DISCONTINUED | OUTPATIENT
Start: 2019-02-05 | End: 2019-02-05 | Stop reason: HOSPADM

## 2019-02-05 RX ORDER — IBUPROFEN 200 MG
200 TABLET ORAL EVERY 6 HOURS PRN
COMMUNITY

## 2019-02-05 RX ORDER — METHYLPREDNISOLONE ACETATE 40 MG/ML
INJECTION, SUSPENSION INTRA-ARTICULAR; INTRALESIONAL; INTRAMUSCULAR; SOFT TISSUE PRN
Status: DISCONTINUED | OUTPATIENT
Start: 2019-02-05 | End: 2019-02-05 | Stop reason: HOSPADM

## 2019-02-05 ASSESSMENT — PAIN - FUNCTIONAL ASSESSMENT
PAIN_FUNCTIONAL_ASSESSMENT: 0-10
PAIN_FUNCTIONAL_ASSESSMENT: PREVENTS OR INTERFERES SOME ACTIVE ACTIVITIES AND ADLS

## 2019-02-05 ASSESSMENT — PAIN DESCRIPTION - DESCRIPTORS: DESCRIPTORS: ACHING

## 2019-02-19 ENCOUNTER — OFFICE VISIT (OUTPATIENT)
Dept: ORTHOPEDIC SURGERY | Age: 84
End: 2019-02-19
Payer: MEDICARE

## 2019-02-19 VITALS
HEART RATE: 57 BPM | HEIGHT: 68 IN | BODY MASS INDEX: 21.99 KG/M2 | SYSTOLIC BLOOD PRESSURE: 96 MMHG | DIASTOLIC BLOOD PRESSURE: 47 MMHG | WEIGHT: 145.06 LBS

## 2019-02-19 DIAGNOSIS — S32.040D CLOSED COMPRESSION FRACTURE OF L4 LUMBAR VERTEBRA WITH ROUTINE HEALING, SUBSEQUENT ENCOUNTER: Primary | ICD-10-CM

## 2019-02-19 DIAGNOSIS — M48.062 LUMBAR STENOSIS WITH NEUROGENIC CLAUDICATION: ICD-10-CM

## 2019-02-19 DIAGNOSIS — M54.16 LUMBAR RADICULITIS: ICD-10-CM

## 2019-02-19 PROCEDURE — 4040F PNEUMOC VAC/ADMIN/RCVD: CPT | Performed by: PHYSICIAN ASSISTANT

## 2019-02-19 PROCEDURE — 99213 OFFICE O/P EST LOW 20 MIN: CPT | Performed by: PHYSICIAN ASSISTANT

## 2019-02-19 PROCEDURE — G8427 DOCREV CUR MEDS BY ELIG CLIN: HCPCS | Performed by: PHYSICIAN ASSISTANT

## 2019-02-19 PROCEDURE — G8420 CALC BMI NORM PARAMETERS: HCPCS | Performed by: PHYSICIAN ASSISTANT

## 2019-02-19 PROCEDURE — 1123F ACP DISCUSS/DSCN MKR DOCD: CPT | Performed by: PHYSICIAN ASSISTANT

## 2019-02-19 PROCEDURE — 1101F PT FALLS ASSESS-DOCD LE1/YR: CPT | Performed by: PHYSICIAN ASSISTANT

## 2019-02-19 PROCEDURE — 1036F TOBACCO NON-USER: CPT | Performed by: PHYSICIAN ASSISTANT

## 2019-02-19 PROCEDURE — G8484 FLU IMMUNIZE NO ADMIN: HCPCS | Performed by: PHYSICIAN ASSISTANT

## 2019-05-08 ENCOUNTER — OFFICE VISIT (OUTPATIENT)
Dept: ORTHOPEDIC SURGERY | Age: 84
End: 2019-05-08
Payer: MEDICARE

## 2019-05-08 VITALS
SYSTOLIC BLOOD PRESSURE: 101 MMHG | WEIGHT: 145.06 LBS | HEART RATE: 61 BPM | HEIGHT: 68 IN | BODY MASS INDEX: 21.99 KG/M2 | DIASTOLIC BLOOD PRESSURE: 74 MMHG

## 2019-05-08 DIAGNOSIS — S32.040D CLOSED COMPRESSION FRACTURE OF L4 LUMBAR VERTEBRA WITH ROUTINE HEALING, SUBSEQUENT ENCOUNTER: Primary | ICD-10-CM

## 2019-05-08 DIAGNOSIS — M48.062 LUMBAR STENOSIS WITH NEUROGENIC CLAUDICATION: ICD-10-CM

## 2019-05-08 DIAGNOSIS — M54.16 LUMBAR RADICULITIS: ICD-10-CM

## 2019-05-08 PROCEDURE — 99214 OFFICE O/P EST MOD 30 MIN: CPT | Performed by: PHYSICIAN ASSISTANT

## 2019-05-08 PROCEDURE — 1036F TOBACCO NON-USER: CPT | Performed by: PHYSICIAN ASSISTANT

## 2019-05-08 PROCEDURE — 1123F ACP DISCUSS/DSCN MKR DOCD: CPT | Performed by: PHYSICIAN ASSISTANT

## 2019-05-08 PROCEDURE — G8427 DOCREV CUR MEDS BY ELIG CLIN: HCPCS | Performed by: PHYSICIAN ASSISTANT

## 2019-05-08 PROCEDURE — 4040F PNEUMOC VAC/ADMIN/RCVD: CPT | Performed by: PHYSICIAN ASSISTANT

## 2019-05-08 PROCEDURE — G8420 CALC BMI NORM PARAMETERS: HCPCS | Performed by: PHYSICIAN ASSISTANT

## 2019-05-08 RX ORDER — OXYCODONE HYDROCHLORIDE AND ACETAMINOPHEN 5; 325 MG/1; MG/1
1 TABLET ORAL
COMMUNITY

## 2019-05-08 NOTE — PROGRESS NOTES
Follow-up: SPINE    CHIEF COMPLAINT:    Chief Complaint   Patient presents with    Back Pain       HISTORY OF PRESENT ILLNESS:                The patient is a 80 y.o. male history of AAA repair, CAD, lung cancer, COPD here for acute/chronic aching low back and buttock pain radiating into the posterior thighs. He reports that his buttock and thigh pain are most bothersome. Pain has been increased the last 1 week. Symptoms are increased with any walking standing or transition. Relief with resting and sitting. Significant relief with previous LUIZ, last in Feb.  Other conservative care includes: bracing for L4 endplate fx (F8KT), hip evaluation with Dr. Luisa De Leno including bilateral GTB injections without improvement, Tylenol, NSAIDs, Flexeril. He is currently on Oxycodone q4-6 PRN through hospice care. He denies any progressive extremity weakness, numbness tingling. No recent bowel or bladder changes. No recent fevers, chills or infection. No recent trauma. Pain Assessment  Location of Pain: Back  Severity of Pain: 9  Quality of Pain: Sharp, Dull, Aching  Duration of Pain: Persistent  Frequency of Pain: Constant  Aggravating Factors: Stairs, Walking, Standing, Squatting, Kneeling, Exercise, Straightening, Stretching, Bending  Limiting Behavior: Yes  Relieving Factors: Rest  Result of Injury: No  Work-Related Injury: No  Are there other pain locations you wish to document?: No    The pain assessment was noted & reviewed in the medical record today.      Current/Past Treatment:   · Physical Therapy: HEP 5 wks bracing   · Chiropractic:  no   · Injection:     2/5/19 Midline L5/S1 interlaminar epidural injection #2--90%  12/18/18 Midline L5/S1 LESI #1--80% IMPROVED   ·  B GTB--no relief (Dr. Luisa De Leon)  Medications:  · Current: NSAIDs, Tylenol, Oxycodone 5 q4-6 PRN  · Past: Flexeril   · Surgery/Consult: no    Past Medical History: Medical history form was reviewed today & scanned into the media tab  Past Medical History: Diagnosis Date    Aneurysm ([facility]     abdominal aortic    CAD (coronary artery disease)     Cancer (HCC)     lung     Cancer (HCC)     skin    Chronic diastolic CHF (grade 2 LVDD) 7/25/2018    [facility]     Heart murmur     Hypercholesteremia     Hypertension     Influenza A 12/7/14      Past Surgical History:     Past Surgical History:   Procedure Laterality Date    ABDOMINAL AORTIC ANEURYSM REPAIR      CHOLECYSTECTOMY, LAPAROSCOPIC N/A 04/03/2018    EPIDURAL STEROID INJECTION N/A 12/18/2018    MIDLINE LUMBAR FIVE SACRAL ONE EPIDURAL STEROID INJECTION SITE CONFIRMED BY FLUOROSCOPY performed by Duglas Fong MD at [facility] N/A 2/5/2019    MIDLINE LUMBAR FIVE SACRAL ONE EPIDURAL STEROID INJECTION SITE CONFIRMED BY FLUOROSCOPY performed by Duglas Fong MD at [address] [place] [address]  06/23/2018    small tear seen in esophagus     Current Medications:     Current Outpatient Medications:     ibuprofen (ADVIL;MOTRIN) 200 MG tablet, Take 200 mg by mouth every 6 hours as needed for Pain, Disp: , Rfl:     Cholecalciferol (VITAMIN D3) 81681 units CAPS, Take 1 capsule by mouth every 30 days, Disp: , Rfl:     nystatin (MYCOSTATIN) 800955 UNIT/ML suspension, Take 10 mLs by mouth 3 times daily, Disp: , Rfl:     oxyCODONE-acetaminophen (PERCOCET) 5-325 MG per tablet, Take 1 tablet by mouth every 8 hours as needed for Pain. ., Disp: , Rfl:     atorvastatin (LIPITOR) 10 MG tablet, Take 10 mg by mouth daily, Disp: , Rfl:     fluticasone-vilanterol (BREO ELLIPTA) 100-25 MCG/INH AEPB inhaler, Inhale 1 puff into the lungs daily, Disp: , Rfl:     docusate sodium (COLACE, DULCOLAX) 100 MG CAPS, Take 100 mg by mouth 2 times daily, Disp: 30 capsule, Rfl: 3    magnesium hydroxide (MILK OF MAGNESIA) 400 MG/5ML suspension, Take 30 mLs by mouth daily as needed for Constipation, Disp: , Rfl:    tamsulosin (FLOMAX) 0.4 MG capsule, Take 1 capsule by mouth daily, Disp: 30 capsule, Rfl: 3    pantoprazole (PROTONIX) 40 MG tablet, Take 1 tablet by mouth 2 times daily (before meals), Disp: 30 tablet, Rfl: 3    cyclobenzaprine (FLEXERIL) 5 MG tablet, Take 1 tablet by mouth 3 times daily as needed for Muscle spasms, Disp: 15 tablet, Rfl: 0    tiotropium (SPIRIVA) 18 MCG inhalation capsule, Inhale 18 mcg into the lungs daily, Disp: , Rfl:     benzonatate (TESSALON PERLES) 100 MG capsule, Take 100 mg by mouth 3 times daily as needed for Cough, Disp: , Rfl:     therapeutic multivitamin-minerals (THERAGRAN-M) tablet, Take 1 tablet by mouth daily. , Disp: , Rfl:     albuterol (PROVENTIL HFA;VENTOLIN HFA) 108 (90 BASE) MCG/ACT inhaler, Inhale 2 puffs into the lungs every 6 hours as needed. , Disp: , Rfl:     aspirin 325 MG tablet, Take 325 mg by mouth daily. , Disp: , Rfl:     furosemide (LASIX) 20 MG tablet, Take 1 tablet by mouth daily, Disp: 30 tablet, Rfl: 0  Allergies:  Patient has no known allergies. Social History:    reports that he quit smoking about 38 years ago. He has never used smokeless tobacco. He reports that he does not drink alcohol or use drugs. Family History:   Family History   Problem Relation Age of Onset    No Known Problems Mother     No Known Problems Father        REVIEW OF SYSTEMS: Full ROS noted & scanned   CONSTITUTIONAL: Denies unexplained weight loss, fevers, chills or fatigue  NEUROLOGICAL: Denies unsteady gait or progressive weakness      PHYSICAL EXAM:    Vitals: Blood pressure 101/74, pulse 61, height 5' 7.99\" (1.727 m), weight 145 lb 1 oz (65.8 kg). GENERAL EXAM:  · General Apparence: 02 dependent. Patient is adequately groomed with no evidence of malnutrition. · Orientation: The patient is oriented to time, place and person.    · Mood & Affect:The patient's mood and affect are appropriate   · Lymphatic: The lymphatic examination bilaterally reveals all areas to be without enlargement or induration  · Sensation: Sensation is intact without deficit    LUMBAR/SACRAL EXAMINATION:  · Inspection: Local inspection shows no step-off or bruising. Kyphosis. Mild gibbus deformity. · Palpation:  Nontender to palpation or percussion. No evidence of tenderness at the midline. · Range of Motion:  Able to sit forward flex, extension not assessed  · Strength:   Strength testing is 5/5 in all muscle groups tested. No focal weakness   · Special Tests:   Straight leg raise and crossed SLR negative. Leg length and pelvis level.  0 out of 5 Torri's signs. · Skin: There are no rashes, ulcerations or lesions. · Reflexes: Reflexes are symmetrically 1+ at the patellar and ankle tendons. Clonus absent bilaterally at the feet. · Gait & station:  Wheelchair   · Additional Examinations:   · RIGHT LOWER EXTREMITY: Inspection/examination of the right lower extremity does not show any tenderness, deformity or injury. Range of motion is full. There is no gross instability. There are no rashes, ulcerations or lesions. Strength and tone are normal.  · LEFT LOWER EXTREMITY:  Inspection/examination of the left lower extremity does not show any tenderness, deformity or injury. Range of motion is full. There is no gross instability. There are no rashes, ulcerations or lesions. Strength and tone are normal.    Diagnostic Testin views lumbar spine 2019 compared to prior stable L4 inferior endplate fracture, severe DDD L5-S1, multilevel facet arthropathy. 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 AAA incompletely evaluated.      Lumbar x-rays reviewed from 10/10/2018 showing age-indeterminate L4 compression

## 2019-05-15 ENCOUNTER — TELEPHONE (OUTPATIENT)
Dept: ORTHOPEDIC SURGERY | Age: 84
End: 2019-05-15

## 2019-05-15 NOTE — TELEPHONE ENCOUNTER
DOS   05/21/2019  CPT   24434  DX   M54.16   M48.062  OP SX AUTH  NPR  REF # [id number]    MIDLINE  LEVELS   L5 - S1   PROCEDURE   INTRALAMNAR LUIZ INJ  DR. [facility]  INSURANCE:   [facility] MEDICARE COMPLETE

## 2019-05-15 NOTE — TELEPHONE ENCOUNTER
DOS   05/21/2019  CPT   34077  85771   88361.26  [id number]  OP SX AUTH  NPR  REF # [id number]    [facility]Z [facility] INJ    [address] [address] [place]:   [facility] MEDICARE COMPLETE

## 2019-05-21 ENCOUNTER — HOSPITAL ENCOUNTER (OUTPATIENT)
Age: 84
Setting detail: OUTPATIENT SURGERY
Discharge: HOME | End: 2019-05-21
Attending: PHYSICAL MEDICINE & REHABILITATION | Admitting: PHYSICAL MEDICINE & REHABILITATION
Payer: COMMERCIAL

## 2019-05-21 VITALS
OXYGEN SATURATION: 95 % | SYSTOLIC BLOOD PRESSURE: 139 MMHG | HEIGHT: 67 IN | RESPIRATION RATE: 20 BRPM | WEIGHT: 154 LBS | TEMPERATURE: 97 F | DIASTOLIC BLOOD PRESSURE: 76 MMHG | HEART RATE: 93 BPM | BODY MASS INDEX: 24.17 KG/M2

## 2019-05-21 PROCEDURE — 6360000004 HC RX CONTRAST MEDICATION: Performed by: PHYSICAL MEDICINE & REHABILITATION

## 2019-05-21 PROCEDURE — 7100000011 HC PHASE II RECOVERY - ADDTL 15 MIN: Performed by: PHYSICAL MEDICINE & REHABILITATION

## 2019-05-21 PROCEDURE — 2500000003 HC RX 250 WO HCPCS: Performed by: PHYSICAL MEDICINE & REHABILITATION

## 2019-05-21 PROCEDURE — 6360000002 HC RX W HCPCS: Performed by: PHYSICAL MEDICINE & REHABILITATION

## 2019-05-21 PROCEDURE — 3600000002 HC SURGERY LEVEL 2 BASE: Performed by: PHYSICAL MEDICINE & REHABILITATION

## 2019-05-21 PROCEDURE — 7100000010 HC PHASE II RECOVERY - FIRST 15 MIN: Performed by: PHYSICAL MEDICINE & REHABILITATION

## 2019-05-21 PROCEDURE — 2709999900 HC NON-CHARGEABLE SUPPLY: Performed by: PHYSICAL MEDICINE & REHABILITATION

## 2019-05-21 RX ORDER — LIDOCAINE HYDROCHLORIDE 10 MG/ML
INJECTION, SOLUTION EPIDURAL; INFILTRATION; INTRACAUDAL; PERINEURAL PRN
Status: DISCONTINUED | OUTPATIENT
Start: 2019-05-21 | End: 2019-05-21 | Stop reason: ALTCHOICE

## 2019-05-21 ASSESSMENT — PAIN - FUNCTIONAL ASSESSMENT
PAIN_FUNCTIONAL_ASSESSMENT: PREVENTS OR INTERFERES WITH MANY ACTIVE NOT PASSIVE ACTIVITIES
PAIN_FUNCTIONAL_ASSESSMENT: 0-10

## 2019-05-21 ASSESSMENT — PAIN DESCRIPTION - DESCRIPTORS: DESCRIPTORS: ACHING;SHARP

## 2019-05-21 NOTE — PROGRESS NOTES
Discharge instructions given to pt and verbalized understanding, states pain is at a tolerable level, no numbness or weakness noted, pt wheeledout to car without complications

## 2019-05-21 NOTE — H&P
cyclobenzaprine (FLEXERIL) 5 MG tablet Take 1 tablet by mouth 3 times daily as needed for Muscle spasms 6/27/18   Angelique Ricci MD   tiotropium (SPIRIVA) 18 MCG inhalation capsule Inhale 18 mcg into the lungs daily    Historical Provider, MD   benzonatate (TESSALON PERLES) 100 MG capsule Take 100 mg by mouth 3 times daily as needed for Cough    Historical Provider, MD   therapeutic multivitamin-minerals (THERAGRAN-M) tablet Take 1 tablet by mouth daily. Historical Provider, MD   albuterol (PROVENTIL HFA;VENTOLIN HFA) 108 (90 BASE) MCG/ACT inhaler Inhale 2 puffs into the lungs every 6 hours as needed. Historical Provider, MD   aspirin 325 MG tablet Take 325 mg by mouth daily. Historical Provider, MD       Vitals: Stable     PHYSICAL EXAM:  HENT: Airway patent and reviewed  Cardiovascular: Normal rate, regular rhythm, normal heart sounds. Pulmonary/Chest: No wheezes. No rhonchi. No rales. Abdominal: Soft. Bowel sounds are normal. No distension. MALLAMPATI:           []   I. Complete visualization of the soft palate           [x]   II. Complete visualization of the uvula            []   III. Visualization of only the base of the uvula           []   IV. Soft palate is not visible     ASA CLASS:         []   I. Normal, healthy adult           [x]   II.  Mild systemic disease            []   III. Severe systemic disease      Sedation plan:   [x]  Local              []  Minimal                  []  General anesthesia    Patient's condition acceptable for planned procedure/sedation. Post Procedure Plan   Return to same level of care   ______________________     The risks and benefits as well as alternatives to the procedure have been discussed with the patient and or family. The patient and or next of kin understands and agrees to proceed.     Daved Ganser, M.D.

## 2019-06-04 ENCOUNTER — OFFICE VISIT (OUTPATIENT)
Dept: ORTHOPEDIC SURGERY | Age: 84
End: 2019-06-04
Payer: MEDICARE

## 2019-06-04 VITALS
HEIGHT: 67 IN | WEIGHT: 154.1 LBS | DIASTOLIC BLOOD PRESSURE: 77 MMHG | SYSTOLIC BLOOD PRESSURE: 101 MMHG | HEART RATE: 90 BPM | BODY MASS INDEX: 24.19 KG/M2

## 2019-06-04 DIAGNOSIS — M54.16 LUMBAR RADICULITIS: ICD-10-CM

## 2019-06-04 DIAGNOSIS — M48.062 LUMBAR STENOSIS WITH NEUROGENIC CLAUDICATION: ICD-10-CM

## 2019-06-04 DIAGNOSIS — S32.040D CLOSED COMPRESSION FRACTURE OF L4 LUMBAR VERTEBRA WITH ROUTINE HEALING, SUBSEQUENT ENCOUNTER: Primary | ICD-10-CM

## 2019-06-04 PROCEDURE — G8420 CALC BMI NORM PARAMETERS: HCPCS | Performed by: PHYSICIAN ASSISTANT

## 2019-06-04 PROCEDURE — 4040F PNEUMOC VAC/ADMIN/RCVD: CPT | Performed by: PHYSICIAN ASSISTANT

## 2019-06-04 PROCEDURE — 99212 OFFICE O/P EST SF 10 MIN: CPT | Performed by: PHYSICIAN ASSISTANT

## 2019-06-04 PROCEDURE — 1036F TOBACCO NON-USER: CPT | Performed by: PHYSICIAN ASSISTANT

## 2019-06-04 PROCEDURE — G8427 DOCREV CUR MEDS BY ELIG CLIN: HCPCS | Performed by: PHYSICIAN ASSISTANT

## 2019-06-04 PROCEDURE — 1123F ACP DISCUSS/DSCN MKR DOCD: CPT | Performed by: PHYSICIAN ASSISTANT

## 2019-06-04 NOTE — PROGRESS NOTES
Follow-up: SPINE    CHIEF COMPLAINT:    Chief Complaint   Patient presents with    Back Pain     F/U LUIZ injection       HISTORY OF PRESENT ILLNESS:                The patient is a 80 y.o. male history of AAA repair, CAD, lung cancer, COPD here to follow up after L5-S1 LESI #3/20/1/2019 for subacute/chronic aching low back and buttock pain radiating into the posterior thighs. He reports that his buttock and thigh pain are most bothersome. Pain flared up the last 1 month. Symptoms were increased with any walking standing or transition. Relief with resting and sitting. He currently reports 75% improvement since the injection with improved function. Other conservative care includes: bracing for L4 endplate fx ([id number]), hip evaluation with Dr. Patrick Christiansen including bilateral GTB injections without improvement, Tylenol, NSAIDs, Flexeril. He is currently on Oxycodone q4-6 PRN through hospice care--he has been able to take less tablet since the [facility]. He denies any progressive extremity weakness, numbness tingling. No recent bowel or bladder changes. No recent fevers, chills or infection. No recent trauma. No side effects of the procedure    Pain Assessment  Location of Pain: Back  Severity of Pain: 3  Quality of Pain: Sharp, Dull, Aching  Duration of Pain: Persistent  Frequency of Pain: Constant  Aggravating Factors: Walking, Stairs, Standing, Squatting, Exercise, Kneeling, Straightening, Stretching, Bending  Limiting Behavior: Yes  Relieving Factors: Rest  Result of Injury: No  Work-Related Injury: No  Are there other pain locations you wish to document?: No    The pain assessment was noted & reviewed in the medical record today.      Current/Past Treatment:   · Physical Therapy: HEP 5 wks bracing   · Chiropractic:  no   · Injection:     5/21/19 L5/S1 interlaminar epidural injection--75% improved  2/5/19 Midline L5/S1 interlaminar epidural injection #2--90%  12/18/18 Midline L5/S1 LESI #1--80% IMPROVED   ·  B GTB--no relief (Dr. Robert Krause)  Medications:  · Current: NSAIDs, Tylenol, Oxycodone 5 q4-6 PRN--hospice   · Past: Flexeril   · Surgery/Consult: no    Past Medical History: Medical history form was reviewed today & scanned into the media tab  Past Medical History:   Diagnosis Date    [facility]     abdominal aortic    CAD (coronary artery disease)     Cancer (HCC)     lung     Cancer ([facility]     skin    Chronic diastolic CHF (grade 2 LVDD) 7/25/2018    COPD (chronic obstructive pulmonary disease) ([facility]     Heart murmur     Hypercholesteremia     Hypertension     Influenza A 12/7/14      Past Surgical History:     Past Surgical History:   Procedure Laterality Date    ABDOMINAL AORTIC ANEURYSM REPAIR      CHOLECYSTECTOMY, LAPAROSCOPIC N/A 04/03/2018    EPIDURAL STEROID INJECTION N/A 12/18/2018    MIDLINE LUMBAR FIVE SACRAL ONE EPIDURAL STEROID INJECTION SITE CONFIRMED BY FLUOROSCOPY performed by Niyah Garibay MD at [address] [place] [address] 2/5/2019    MIDLINE LUMBAR FIVE SACRAL ONE EPIDURAL STEROID INJECTION SITE CONFIRMED BY FLUOROSCOPY performed by Niyah Garibay MD at [address] [place] [address] N/A 5/21/2019    MIDLINE LUMBAR FIVE SACRAL ONE EPIDURAL STEROID INJECTION SITE CONFIRMED BY FLUOROSCOPY performed by Niyah Garibay MD at [address]  06/23/2018    small tear seen in esophagus     Current Medications:     Current Outpatient Medications:     oxyCODONE-acetaminophen (PERCOCET) 5-325 MG per tablet, Take 1 tablet by mouth every 4-6 hours as needed for Pain., Disp: , Rfl:     ibuprofen (ADVIL;MOTRIN) 200 MG tablet, Take 200 mg by mouth every 6 hours as needed for Pain, Disp: , Rfl:     Cholecalciferol (VITAMIN D3) 28782 units CAPS, Take 1 capsule by mouth every 30 days, Disp: , Rfl:     nystatin (MYCOSTATIN) 904494 UNIT/ML suspension, Take 10 mLs by mouth 3 times daily, Disp: , Rfl:    oxyCODONE-acetaminophen (PERCOCET) 5-325 MG per tablet, Take 1 tablet by mouth every 8 hours as needed for Pain. ., Disp: , Rfl:     atorvastatin (LIPITOR) 10 MG tablet, Take 10 mg by mouth daily, Disp: , Rfl:     fluticasone-vilanterol (BREO ELLIPTA) 100-25 MCG/INH AEPB inhaler, Inhale 1 puff into the lungs daily, Disp: , Rfl:     docusate sodium (COLACE, DULCOLAX) 100 MG CAPS, Take 100 mg by mouth 2 times daily, Disp: 30 capsule, Rfl: 3    magnesium hydroxide (MILK OF MAGNESIA) 400 MG/5ML suspension, Take 30 mLs by mouth daily as needed for Constipation, Disp: , Rfl:     tamsulosin (FLOMAX) 0.4 MG capsule, Take 1 capsule by mouth daily, Disp: 30 capsule, Rfl: 3    pantoprazole (PROTONIX) 40 MG tablet, Take 1 tablet by mouth 2 times daily (before meals), Disp: 30 tablet, Rfl: 3    cyclobenzaprine (FLEXERIL) 5 MG tablet, Take 1 tablet by mouth 3 times daily as needed for Muscle spasms, Disp: 15 tablet, Rfl: 0    tiotropium (SPIRIVA) 18 MCG inhalation capsule, Inhale 18 mcg into the lungs daily, Disp: , Rfl:     benzonatate (TESSALON PERLES) 100 MG capsule, Take 100 mg by mouth 3 times daily as needed for Cough, Disp: , Rfl:     therapeutic multivitamin-minerals (THERAGRAN-M) tablet, Take 1 tablet by mouth daily. , Disp: , Rfl:     albuterol (PROVENTIL HFA;VENTOLIN HFA) 108 (90 BASE) MCG/ACT inhaler, Inhale 2 puffs into the lungs every 6 hours as needed. , Disp: , Rfl:     aspirin 325 MG tablet, Take 325 mg by mouth daily. , Disp: , Rfl:     furosemide (LASIX) 20 MG tablet, Take 1 tablet by mouth daily, Disp: 30 tablet, Rfl: 0  Allergies:  Patient has no known allergies. Social History:    reports that he quit smoking about 38 years ago. He has never used smokeless tobacco. He reports that he does not drink alcohol or use drugs.   Family History:   Family History   Problem Relation Age of Onset    No Known Problems Mother     No Known Problems Father        REVIEW OF SYSTEMS: Full ROS noted & scanned CONSTITUTIONAL: Denies unexplained weight loss, fevers, chills or fatigue  NEUROLOGICAL: Denies unsteady gait or progressive weakness      PHYSICAL EXAM:    Vitals: Blood pressure 101/77, pulse 90, height 5' 7.01\" (1.702 m), weight 154 lb 1.6 oz (69.9 kg). GENERAL EXAM:  · General Apparence: 02 dependent. Patient is adequately groomed with no evidence of malnutrition. · Orientation: The patient is oriented to time, place and person. · Mood & Affect:The patient's mood and affect are appropriate   · Lymphatic: The lymphatic examination bilaterally reveals all areas to be without enlargement or induration  · Sensation: Sensation is intact without deficit    LUMBAR/SACRAL EXAMINATION:  · Inspection: Local inspection shows no step-off or bruising. Kyphosis. Mild gibbus deformity. · Palpation:  Nontender to palpation or percussion. No evidence of tenderness at the midline. · Range of Motion:  Able to sit forward flex, extension not assessed  · Strength:   Strength testing is 5/5 in all muscle groups tested. No focal weakness   · Special Tests:   Straight leg raise and crossed SLR negative. Leg length and pelvis level.  0 out of 5 Torri's signs. · Skin: There are no rashes, ulcerations or lesions. · Reflexes: Reflexes are symmetrically 1+ at the patellar and ankle tendons. Clonus absent bilaterally at the feet. · Gait & station:  Wheelchair   · Additional Examinations:   · RIGHT LOWER EXTREMITY: Inspection/examination of the right lower extremity does not show any tenderness, deformity or injury. Range of motion is full. There is no gross instability. There are no rashes, ulcerations or lesions. Strength and tone are normal.  · LEFT LOWER EXTREMITY:  Inspection/examination of the left lower extremity does not show any tenderness, deformity or injury. Range of motion is full. There is no gross instability. There are no rashes, ulcerations or lesions.   Strength and tone are normal.    Diagnostic Testin views lumbar spine 2019 compared to prior stable L4 inferior endplate fracture, severe DDD L5-S1, multilevel facet arthropathy. 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 AAA incompletely evaluated. Lumbar x-rays reviewed from 10/10/2018 showing age-indeterminate L4 compression fracture, mild T12 superior endplate fracture, moderate to severe DDD L5-S1, multilevel facet arthropathy. 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 Arielle Jones  4) H/o lung cancer--opioid maintenance via hospice care, CAD, AAA repair       Plan:   1) He is overall feeling significantly improved at this time. He has been able to take less oxycodone since his epidural injection. We discussed repeat L5-S1 LESI (#4) if needed. ESIs allow him to be more functional and take less pain medication.  He is not a good surgical candidate given his underlying health issues        [facility]

## (undated) DEVICE — UNIVERSAL BLOCK TRAY: Brand: MEDLINE INDUSTRIES, INC.

## (undated) DEVICE — ALCOHOL RUBBING 16OZ 70% ISO

## (undated) DEVICE — CHLORAPREP 26ML ORANGE

## (undated) DEVICE — TOWEL OR BLUEE 16X26IN ST 8 PACK ORB08 16X26ORTWL

## (undated) DEVICE — GAUZE,SPONGE,4"X4",16PLY,STRL,LF,10/TRAY: Brand: MEDLINE

## (undated) DEVICE — STERILE POLYISOPRENE POWDER-FREE SURGICAL GLOVES: Brand: PROTEXIS